# Patient Record
Sex: FEMALE | Race: WHITE | URBAN - METROPOLITAN AREA
[De-identification: names, ages, dates, MRNs, and addresses within clinical notes are randomized per-mention and may not be internally consistent; named-entity substitution may affect disease eponyms.]

---

## 2017-01-02 ENCOUNTER — TELEPHONE (OUTPATIENT)
Dept: FAMILY MEDICINE | Facility: CLINIC | Age: 52
End: 2017-01-02

## 2017-01-02 NOTE — TELEPHONE ENCOUNTER
1/2/2017    Call Regarding Preventive Health Screening Colonoscopy, Mammogram and Cervical/PAP    Attempt 1    Message on voicemail     Comments:           Outreach   kelvin

## 2017-01-09 NOTE — TELEPHONE ENCOUNTER
01/09/17      Call Regarding Preventive Health Screening Colonoscopy, Mammogram and Cervical/PAP    Attempt 1    Message on voicemail     Comments: Fit ordered        Outreach   cnt

## 2017-01-18 ENCOUNTER — TELEPHONE (OUTPATIENT)
Dept: FAMILY MEDICINE | Facility: CLINIC | Age: 52
End: 2017-01-18

## 2017-01-18 DIAGNOSIS — I10 BENIGN ESSENTIAL HYPERTENSION: Primary | ICD-10-CM

## 2017-01-18 NOTE — TELEPHONE ENCOUNTER
Lisinopril      Last Written Prescription Date: 10/17/16  Last Fill Quantity: 30, # refills: 2  Last Office Visit with G, P or [facility] prescribing provider: 10/17/16       POTASSIUM   Date Value Ref Range Status   05/16/2016 3.1* 3.4 - 5.3 mmol/L Final     CREATININE   Date Value Ref Range Status   05/16/2016 0.85 0.52 - 1.04 mg/dL Final     BP Readings from Last 3 Encounters:   10/17/16 142/98   09/12/16 138/99   06/24/16 150/94

## 2017-01-23 RX ORDER — LISINOPRIL 10 MG/1
10 TABLET ORAL DAILY
Qty: 30 TABLET | Refills: 0 | Status: SHIPPED | OUTPATIENT
Start: 2017-01-23 | End: 2017-02-08

## 2017-01-23 RX ORDER — ATENOLOL AND CHLORTHALIDONE TABLET 100; 25 MG/1; MG/1
1 TABLET ORAL DAILY
Qty: 30 TABLET | Refills: 0 | Status: SHIPPED | OUTPATIENT
Start: 2017-01-23 | End: 2017-02-22

## 2017-01-23 NOTE — TELEPHONE ENCOUNTER
Pt calling for refills on both of these. She has been out of her lisinopril for a couple of days, and is wondering if this can be filled by this afternoon please.      atenolol-chlorthalidone (TENORETIC 100) 100-25 MG per tablet        Last Written Prescription Date: 10/17/16  Last Fill Quantity: 90, # refills: 3    Last Office Visit with G, P or [facility] prescribing provider:  10/17/16   Future Office Visit:        BP Readings from Last 3 Encounters:   10/17/16 142/98   09/12/16 138/99   06/24/16 150/94

## 2017-01-23 NOTE — TELEPHONE ENCOUNTER
Patient has been out of medications for a few days.  Patient to restart and then follow up in clinic for RN visit to check blood pressure in about a week.  Patient agrees with this plan.    Jessica Medina RN

## 2017-02-07 ENCOUNTER — TELEPHONE (OUTPATIENT)
Dept: FAMILY MEDICINE | Facility: CLINIC | Age: 52
End: 2017-02-07

## 2017-02-07 NOTE — Clinical Note
[facility]  [address] [address]  Phone: [phone]    February 9, 2017    Toya Mcnair  [address] [place] [address]              Dear Toya,    Your most recent blood pressure reading preformed at our clinic was higher than we like to see it. The goal is to have it under 140/90 in clinic. Please call our clinic [phone] to schedule a blood pressure recheck on our RN schedule. This appointment is free of charge and takes about 15 minutes to complete. Be sure to take all of your blood pressure medications, and avoid stimulants like caffeine, cold medicines, sudafed, or tobacco prior to your recheck.    If your blood pressure medication was changed by your provider recently wait 2 weeks before making this recheck appointment.     Thank you for trusting us with your health care.  Sincerely,    Elizabet Rosales's Care Team

## 2017-02-08 DIAGNOSIS — I10 BENIGN ESSENTIAL HYPERTENSION: Primary | ICD-10-CM

## 2017-02-08 NOTE — TELEPHONE ENCOUNTER
In January, pt was going to restart lisinopril and then return for a blood pressure recheck with the RN.  No pending appt.    Please remind.  Lea Newby RN

## 2017-02-08 NOTE — TELEPHONE ENCOUNTER
Lisinopril      Last Written Prescription Date: 01/23/2017  Last Fill Quantity: 30, # refills: 0  Last Office Visit with [facility], P or [facility] prescribing provider: 10/17/2016       POTASSIUM   Date Value Ref Range Status   05/16/2016 3.1* 3.4 - 5.3 mmol/L Final     CREATININE   Date Value Ref Range Status   05/16/2016 0.85 0.52 - 1.04 mg/dL Final     BP Readings from Last 3 Encounters:   10/17/16 142/98   09/12/16 138/99   06/24/16 150/94     Apolinar LEIVA (R)

## 2017-02-14 NOTE — TELEPHONE ENCOUNTER
Routing refill request to provider for review/approval because:  Labs out of range:  See below    Rayne Scales RN

## 2017-02-15 RX ORDER — LISINOPRIL 10 MG/1
10 TABLET ORAL DAILY
Qty: 15 TABLET | Refills: 0 | Status: SHIPPED | OUTPATIENT
Start: 2017-02-15 | End: 2017-02-22

## 2017-02-15 NOTE — TELEPHONE ENCOUNTER
Partial refill given- patient was suppose to recheck her K+ levels  Please order BMP and have patient get it done since her K+ level was low last time

## 2017-02-22 ENCOUNTER — ALLIED HEALTH/NURSE VISIT (OUTPATIENT)
Dept: FAMILY MEDICINE | Facility: CLINIC | Age: 52
End: 2017-02-22
Payer: COMMERCIAL

## 2017-02-22 VITALS — DIASTOLIC BLOOD PRESSURE: 85 MMHG | SYSTOLIC BLOOD PRESSURE: 122 MMHG | HEART RATE: 72 BPM

## 2017-02-22 DIAGNOSIS — I10 BENIGN ESSENTIAL HYPERTENSION: ICD-10-CM

## 2017-02-22 PROCEDURE — 99207 ZZC NO CHARGE NURSE ONLY: CPT

## 2017-02-22 RX ORDER — ATENOLOL AND CHLORTHALIDONE TABLET 100; 25 MG/1; MG/1
1 TABLET ORAL DAILY
Qty: 30 TABLET | Refills: 3 | Status: SHIPPED | OUTPATIENT
Start: 2017-02-22 | End: 2017-06-08

## 2017-02-22 RX ORDER — LISINOPRIL 10 MG/1
10 TABLET ORAL DAILY
Qty: 30 TABLET | Refills: 3 | Status: SHIPPED | OUTPATIENT
Start: 2017-02-22 | End: 2017-06-08

## 2017-02-22 NOTE — MR AVS SNAPSHOT
"              After Visit Summary   2017    Toya Mcnair    MRN: [id number]           Patient Information     Date Of Birth          1965        Visit Information        Provider Department      2017 3:00 PM LIAT RILEY/ANDRES RN [facility]        Today's Diagnoses     Benign essential hypertension          Care Instructions    Needs potassium lab draw.  Telma Morejon RN         Follow-ups after your visit        Follow-up notes from your care team     Discussed this visit      Who to contact     If you have questions or need follow up information about today's clinic visit or your schedule please contact [facility] directly at [phone].  Normal or non-critical lab and imaging results will be communicated to you by [employer], letter or phone within 4 business days after the clinic has received the results. If you do not hear from us within 7 days, please contact the clinic through [employer] or phone. If you have a critical or abnormal lab result, we will notify you by phone as soon as possible.  Submit refill requests through [employer] or call your pharmacy and they will forward the refill request to us. Please allow 3 business days for your refill to be completed.          Additional Information About Your Visit        MyChart Information     [employer] lets you send messages to your doctor, view your test results, renew your prescriptions, schedule appointments and more. To sign up, go to [email or web address] . Click on \"Log in\" on the left side of the screen, which will take you to the Welcome page. Then click on \"Sign up Now\" on the right side of the page.     You will be asked to enter the access code listed below, as well as some personal information. Please follow the directions to create your username and password.     Your access code is: [email or web address]  Expires: 2017  3:24 PM     Your access code will  in 90 days. If you need help or a new code, please call " your [facility] or [phone].        Care EveryWhere ID     This is your Care EveryWhere ID. This could be used by other organizations to access your [place] medical records  [id number]        Your Vitals Were     Pulse                   72            Blood Pressure from Last 3 Encounters:   02/22/17 122/85   10/17/16 (!) 142/98   09/12/16 (!) 138/99    Weight from Last 3 Encounters:   10/17/16 154 lb 9.6 oz (70.1 kg)   05/02/16 152 lb 12.8 oz (69.3 kg)   05/05/15 152 lb (68.9 kg)              Today, you had the following     No orders found for display         Where to get your medicines      These medications were sent to [id number] - [place], [place] - [address]  [address] [address]    Hours:  Closed 10-16-08 business to [facility] Phone:  [phone]     atenolol-chlorthalidone 100-25 MG per tablet    lisinopril 10 MG tablet          Primary Care Provider Office Phone # Fax #    Elizabet Milton DANA Rosales [facility] [phone]       [facility] [address] [facility] [address]        Thank you!     Thank you for choosing [facility]  for your care. Our goal is always to provide you with excellent care. Hearing back from our patients is one way we can continue to improve our services. Please take a few minutes to complete the written survey that you may receive in the mail after your visit with us. Thank you!             Your Updated Medication List - Protect others around you: Learn how to safely use, store and throw away your medicines at www.disposemymeds.org.          This list is accurate as of: 2/22/17  3:24 PM.  Always use your most recent med list.                   Brand Name Dispense Instructions for use    albuterol 108 (90 BASE) MCG/ACT Inhaler    PROAIR HFA/PROVENTIL HFA/VENTOLIN HFA    1 Inhaler    INHALE 1-2 PUFFS EVERY 4-6 HOURS AS NEEDED       atenolol-chlorthalidone 100-25 MG per tablet    TENORETIC 100    30  tablet    Take 1 tablet by mouth daily       lisinopril 10 MG tablet    PRINIVIL/ZESTRIL    30 tablet    Take 1 tablet (10 mg) by mouth daily       potassium chloride 10 MEQ tablet    K-TAB,KLOR-CON    90 tablet    Take 1 tablet (10 mEq) by mouth daily       WOMENS ONE DAILY Tabs      Take 1 tablet by mouth daily.

## 2017-05-17 NOTE — TELEPHONE ENCOUNTER
5/17/2017    Call Regarding Preventive Health Screening Colonoscopy, Mammogram and Cervical/PAP    Attempt 3    Message on voicemail     Comments:       Outreach   CC

## 2017-06-08 DIAGNOSIS — I10 BENIGN ESSENTIAL HYPERTENSION: ICD-10-CM

## 2017-06-08 NOTE — TELEPHONE ENCOUNTER
Atenolol/Chlor      Last Written Prescription Date: 02/22/17  Last Fill Quantity: 30, # refills: 3    Last Office Visit with [facility] or [facility] prescribing provider:  10/17/16   Future Office Visit:        BP Readings from Last 3 Encounters:   02/22/17 122/85   10/17/16 (!) 142/98   09/12/16 (!) 138/99     Lisinopril      Last Written Prescription Date: 02/22/17  Last Fill Quantity: 30, # refills: 3  Last Office Visit with [facility], [facility] or [facility] prescribing provider: 10/17/16       Potassium   Date Value Ref Range Status   05/16/2016 3.1 (L) 3.4 - 5.3 mmol/L Final     Creatinine   Date Value Ref Range Status   05/16/2016 0.85 0.52 - 1.04 mg/dL Final     BP Readings from Last 3 Encounters:   02/22/17 122/85   10/17/16 (!) 142/98   09/12/16 (!) 138/99

## 2017-06-21 RX ORDER — LISINOPRIL 10 MG/1
10 TABLET ORAL DAILY
Qty: 30 TABLET | Refills: 0 | Status: SHIPPED | OUTPATIENT
Start: 2017-06-21 | End: 2017-06-23

## 2017-06-21 RX ORDER — ATENOLOL AND CHLORTHALIDONE TABLET 100; 25 MG/1; MG/1
1 TABLET ORAL DAILY
Qty: 30 TABLET | Refills: 0 | Status: SHIPPED | OUTPATIENT
Start: 2017-06-21 | End: 2017-06-23

## 2017-06-21 NOTE — TELEPHONE ENCOUNTER
Medication is being filled for 1 time refill only due to:  Patient needs labs bmp ordered. . Kinjal Rubio RNC

## 2017-06-23 ENCOUNTER — OFFICE VISIT (OUTPATIENT)
Dept: FAMILY MEDICINE | Facility: CLINIC | Age: 52
End: 2017-06-23
Payer: COMMERCIAL

## 2017-06-23 VITALS
DIASTOLIC BLOOD PRESSURE: 88 MMHG | WEIGHT: 152 LBS | BODY MASS INDEX: 26.93 KG/M2 | HEIGHT: 63 IN | TEMPERATURE: 99 F | SYSTOLIC BLOOD PRESSURE: 138 MMHG | HEART RATE: 75 BPM

## 2017-06-23 DIAGNOSIS — F17.200 TOBACCO USE DISORDER: ICD-10-CM

## 2017-06-23 DIAGNOSIS — Z12.11 SPECIAL SCREENING FOR MALIGNANT NEOPLASMS, COLON: ICD-10-CM

## 2017-06-23 DIAGNOSIS — I10 BENIGN ESSENTIAL HYPERTENSION: ICD-10-CM

## 2017-06-23 DIAGNOSIS — Z12.39 SCREENING FOR BREAST CANCER: Primary | ICD-10-CM

## 2017-06-23 PROCEDURE — 99214 OFFICE O/P EST MOD 30 MIN: CPT | Performed by: NURSE PRACTITIONER

## 2017-06-23 RX ORDER — ATENOLOL AND CHLORTHALIDONE TABLET 100; 25 MG/1; MG/1
1 TABLET ORAL DAILY
Qty: 90 TABLET | Refills: 3 | Status: SHIPPED | OUTPATIENT
Start: 2017-06-23 | End: 2018-06-28

## 2017-06-23 RX ORDER — LISINOPRIL 10 MG/1
10 TABLET ORAL DAILY
Qty: 90 TABLET | Refills: 3 | Status: SHIPPED | OUTPATIENT
Start: 2017-06-23 | End: 2018-06-28

## 2017-06-23 NOTE — PATIENT INSTRUCTIONS
1. Labs today  2. Send in FIT card  3. Schedule mammogram       Thank you for choosing [facility].  You may be receiving a survey in the mail from Albin Shaikh regarding your visit today.  Please take a few minutes to complete and return the survey to let us know how we are doing.      If you have questions or concerns, please contact us via [employer] or you can contact your care team at [phone].    Our Clinic hours are:  Monday 6:40 am  to 7:00 pm  Tuesday -Friday 6:40 am to 5:00 pm    The [place] outpatient lab hours are:  Monday - Friday 6:10 am to 4:45 pm  Saturdays 7:00 am to 11:00 am  Appointments are required, call [phone]    If you have clinical questions after hours or would like to schedule an appointment,  call the clinic at [phone].

## 2017-06-23 NOTE — NURSING NOTE
"Chief Complaint   Patient presents with     Hypertension     Recheck on blood pressure.     Health Maintenance     Due for a FIT test, mammogram and physical.       Initial BP (!) 139/102  Pulse 75  Temp 99  F (37.2  C) (Tympanic)  Ht 5' 3.25\" (1.607 m)  Wt 152 lb (68.9 kg)  BMI 26.71 kg/m2 Estimated body mass index is 26.71 kg/(m^2) as calculated from the following:    Height as of this encounter: 5' 3.25\" (1.607 m).    Weight as of this encounter: 152 lb (68.9 kg).  Medication Reconciliation: complete  "

## 2017-06-23 NOTE — PROGRESS NOTES
SUBJECTIVE:                                                    Toya Mcnair is a 51 year old female who presents to clinic today for the following health issues:      Hypertension Follow-up      Outpatient blood pressures are being checked at home.  Results are in the normal range she states.  Around 120's/83.    Low Salt Diet: Using salt.  BP Readings from Last 3 Encounters:   06/23/17 138/88   02/22/17 122/85   10/17/16 (!) 142/98     patient smoked prior to coming today      POTASSIUM:  Not able to take Potassium due to it making her gag.  She has been drinking a V8 daily for the 700 mg of Potassium.    MEDICATION QUESTION:  She is dealing with poison ivy on her legs.  Now an area on her arms.  She sis using Calamine lotion on the legs.  She is wanting to know if she can take Benadryl with her current blood pressure mediations for the itch.    Medication Followup of Inhaler    Taking Medication as prescribed: Yes, as needed when she has uri symptoms.    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  Walking 3 days per week during the Summer.    Problems taking medications regularly: No    Medication side effects: none    Diet: Using salt intake.        -------------------------------------    Problem list and histories reviewed & adjusted, as indicated.  Additional history: as documented    Patient Active Problem List   Diagnosis     Anxiety     CARDIOVASCULAR SCREENING; LDL GOAL LESS THAN 160     Tobacco abuse     Benign essential hypertension     Past Surgical History:   Procedure Laterality Date     SURGICAL HISTORY OF -       c section       Social History   Substance Use Topics     Smoking status: Not on file     Smokeless tobacco: Never Used      Comment: cutting back     Alcohol use 0.0 oz/week     0 Standard drinks or equivalent per week      Comment: occ     Family History   Problem Relation Age  "of Onset     HEART DISEASE Mother      C.A.D. Mother      CANCER Maternal Grandmother      Unknown/Adopted Maternal Grandfather      Unknown/Adopted Paternal Grandmother      Unknown/Adopted Paternal Grandfather      CANCER Paternal Grandfather      Breast Cancer Sister      DIABETES Sister      C.A.D. Brother      Heart Transplant Brother 68     Heart Surgery Brother 60     triple bipass           Reviewed and updated as needed this visit by clinical staff  Allergies       Reviewed and updated as needed this visit by Provider         ROS:  Constitutional, HEENT, cardiovascular, pulmonary, GI, , musculoskeletal, neuro, skin, endocrine and psych systems are negative, except as otherwise noted.    OBJECTIVE:     /88  Pulse 75  Temp 99  F (37.2  C) (Tympanic)  Ht 5' 3.25\" (1.607 m)  Wt 152 lb (68.9 kg)  BMI 26.71 kg/m2  Body mass index is 26.71 kg/(m^2).  GENERAL: healthy, alert and no distress  NECK: no adenopathy, no asymmetry, masses, or scars and thyroid normal to palpation  RESP: lungs clear to auscultation - no rales, rhonchi or wheezes  CV: regular rate and rhythm, normal S1 S2, no S3 or S4, no murmur, click or rub, no peripheral edema and peripheral pulses strong  MS: no gross musculoskeletal defects noted, no edema    Diagnostic Test Results:  none     ASSESSMENT/PLAN:       1. Benign essential hypertension  Controlled-  Encouraged to quit smoking  - Basic metabolic panel  - lisinopril (PRINIVIL/ZESTRIL) 10 MG tablet; Take 1 tablet (10 mg) by mouth daily  Dispense: 90 tablet; Refill: 3  - atenolol-chlorthalidone (TENORETIC 100) 100-25 MG per tablet; Take 1 tablet by mouth daily  Dispense: 90 tablet; Refill: 3    2. Screening for breast cancer    - *MA Screening Digital Bilateral; Future    3. Special screening for malignant neoplasms, colon    - Fecal colorectal cancer screen (FIT); Future    4. Tobacco use disorder  Encouraged patient to stop smoking- she declined at this time.           Elizabet" DANA Pandya [facility]

## 2017-06-23 NOTE — MR AVS SNAPSHOT
After Visit Summary   6/23/2017    Toya Mcnair    MRN: [id number]           Patient Information     Date Of Birth          1965        Visit Information        Provider Department      6/23/2017 1:40 PM Elizabet Rosales APRN CNP [facility]        Today's Diagnoses     Screening for breast cancer    -  1    Benign essential hypertension        Special screening for malignant neoplasms, colon        Tobacco use disorder          Care Instructions    1. Labs today  2. Send in FIT card  3. Schedule mammogram       Thank you for choosing [facility].  You may be receiving a survey in the mail from [employer] regarding your visit today.  Please take a few minutes to complete and return the survey to let us know how we are doing.      If you have questions or concerns, please contact us via [employer] or you can contact your care team at [phone].    Our Clinic hours are:  Monday 6:40 am  to 7:00 pm  Tuesday -Friday 6:40 am to 5:00 pm    The [place] outpatient lab hours are:  Monday - Friday 6:10 am to 4:45 pm  Saturdays 7:00 am to 11:00 am  Appointments are required, call [phone]    If you have clinical questions after hours or would like to schedule an appointment,  call the clinic at [phone].            Follow-ups after your visit        Future tests that were ordered for you today     Open Future Orders        Priority Expected Expires Ordered    *MA Screening Digital Bilateral Routine  6/23/2018 6/23/2017    Fecal colorectal cancer screen (FIT) Routine 7/14/2017 9/15/2017 6/23/2017            Who to contact     If you have questions or need follow up information about today's clinic visit or your schedule please contact [facility] directly at [phone].  Normal or non-critical lab and imaging results will be communicated to you by MyChart, letter or phone within 4 business days after the clinic has received the results. If you do not hear from  "us within 7 days, please contact the clinic through [employer] or phone. If you have a critical or abnormal lab result, we will notify you by phone as soon as possible.  Submit refill requests through [employer] or call your pharmacy and they will forward the refill request to us. Please allow 3 business days for your refill to be completed.          Additional Information About Your Visit        [employer] Information     [employer] lets you send messages to your doctor, view your test results, renew your prescriptions, schedule appointments and more. To sign up, go to [email or web address] . Click on \"Log in\" on the left side of the screen, which will take you to the Welcome page. Then click on \"Sign up Now\" on the right side of the page.     You will be asked to enter the access code listed below, as well as some personal information. Please follow the directions to create your username and password.     Your access code is: [email or web address]  Expires: 2017  2:10 PM     Your access code will  in 90 days. If you need help or a new code, please call your [facility] clinic or [phone].        Care EveryWhere ID     This is your Care EveryWhere ID. This could be used by other organizations to access your [facility] medical records  [id number]        Your Vitals Were     Pulse Temperature Height BMI (Body Mass Index)          75 99  F (37.2  C) (Tympanic) 5' 3.25\" (1.607 m) 26.71 kg/m2         Blood Pressure from Last 3 Encounters:   17 138/88   17 122/85   10/17/16 (!) 142/98    Weight from Last 3 Encounters:   17 152 lb (68.9 kg)   10/17/16 154 lb 9.6 oz (70.1 kg)   16 152 lb 12.8 oz (69.3 kg)              We Performed the Following     Basic metabolic panel          Where to get your medicines      These medications were sent to [id number] - [place], [place] - [address]  [address] [address]    Hours:  Closed 10-16-08 business to [facility] Phone:  " [phone]     atenolol-chlorthalidone 100-25 MG per tablet    lisinopril 10 MG tablet          Primary Care Provider Office Phone # Fax #    DANA Kenney [phone]       [facility] [address] [address]        Equal Access to Services     FAN GUILLAUME : Hadii aad ku hadasho Soomaali, waaxda luqadaha, qaybta kaalmada adeegyada, waxay idiin hayaan adeeg kharash lajaqui . So [facility] [phone].    ATENCIÓN: Si habla español, tiene a lopez disposición servicios gratuitos de asistencia lingüística. Arben al [phone].    We comply with applicable federal civil rights laws and [place] laws. We do not discriminate on the basis of race, color, national origin, age, disability sex, sexual orientation or gender identity.            Thank you!     Thank you for choosing [facility]  for your care. Our goal is always to provide you with excellent care. Hearing back from our patients is one way we can continue to improve our services. Please take a few minutes to complete the written survey that you may receive in the mail after your visit with us. Thank you!             Your Updated Medication List - Protect others around you: Learn how to safely use, store and throw away your medicines at www.disposemymeds.org.          This list is accurate as of: 6/23/17  2:10 PM.  Always use your most recent med list.                   Brand Name Dispense Instructions for use Diagnosis    albuterol 108 (90 BASE) MCG/ACT Inhaler    PROAIR HFA/PROVENTIL HFA/VENTOLIN HFA    1 Inhaler    INHALE 1-2 PUFFS EVERY 4-6 HOURS AS NEEDED    Tobacco abuse       atenolol-chlorthalidone 100-25 MG per tablet    TENORETIC 100    90 tablet    Take 1 tablet by mouth daily    Benign essential hypertension       lisinopril 10 MG tablet    PRINIVIL/ZESTRIL    90 tablet    Take 1 tablet (10 mg) by mouth daily    Benign essential hypertension       WOMENS ONE DAILY Tabs      Take 1 tablet by  mouth daily.

## 2017-06-28 ENCOUNTER — TELEPHONE (OUTPATIENT)
Dept: FAMILY MEDICINE | Facility: CLINIC | Age: 52
End: 2017-06-28

## 2017-06-28 NOTE — TELEPHONE ENCOUNTER
Panel Management Review      Patient has the following on her problem list: None      Composite cancer screening  Chart review shows that this patient is due/due soon for the following Pap Smear  Summary:    Patient is due/failing the following:   PAP    Action needed:   Patient needs office visit for pap.    Type of outreach:    Phone, left message for patient to call back.     Questions for provider review:    None                                                                                                                                    Paula Short       Chart routed to Care Team .

## 2017-07-15 ENCOUNTER — HEALTH MAINTENANCE LETTER (OUTPATIENT)
Age: 52
End: 2017-07-15

## 2017-07-19 DIAGNOSIS — I10 BENIGN ESSENTIAL HYPERTENSION: ICD-10-CM

## 2017-07-19 LAB
ANION GAP SERPL CALCULATED.3IONS-SCNC: 7 MMOL/L (ref 3–14)
BUN SERPL-MCNC: 12 MG/DL (ref 7–30)
CALCIUM SERPL-MCNC: 9.1 MG/DL (ref 8.5–10.1)
CHLORIDE SERPL-SCNC: 103 MMOL/L (ref 94–109)
CO2 SERPL-SCNC: 28 MMOL/L (ref 20–32)
CREAT SERPL-MCNC: 1.05 MG/DL (ref 0.52–1.04)
GFR SERPL CREATININE-BSD FRML MDRD: 55 ML/MIN/1.7M2
GLUCOSE SERPL-MCNC: 79 MG/DL (ref 70–99)
POTASSIUM SERPL-SCNC: 3.8 MMOL/L (ref 3.4–5.3)
SODIUM SERPL-SCNC: 138 MMOL/L (ref 133–144)

## 2017-07-19 PROCEDURE — 80048 BASIC METABOLIC PNL TOTAL CA: CPT | Performed by: NURSE PRACTITIONER

## 2017-07-19 PROCEDURE — 36415 COLL VENOUS BLD VENIPUNCTURE: CPT | Performed by: NURSE PRACTITIONER

## 2017-07-19 NOTE — LETTER
Toya NIELSEN Xu  [address] [place] [id number]        July 20, 2017          Dear ,    We are writing to inform you of your test results.    Your lab results were within normal limits.      Thank you very much for choosing [facility]. Please call my office if you have any questions or concerns.       Sincerely,        Elizabet Rosales NP

## 2017-09-13 ENCOUNTER — TELEPHONE (OUTPATIENT)
Dept: FAMILY MEDICINE | Facility: CLINIC | Age: 52
End: 2017-09-13

## 2017-09-13 NOTE — TELEPHONE ENCOUNTER
Pt is due for a PAP, Mammo and a FIT Test. Left a message for patient to return call.  Ebony Bravo, CMA

## 2017-09-13 NOTE — LETTER
November 15, 2017      Toya Parrishjoe  [address] [place] [id number]    Dear ,      This letter is to remind you that you are due for your pap and mammogram    Please call [phone] to schedule your appointment at your earliest convenience.     If you have completed the tests outside of [place], please have the results forwarded to our office. We will update the chart for your primary Physician to review before your next annual physical.     Sincerely,      DANA Kenney CNP

## 2017-09-13 NOTE — LETTER
"September 20, 2017      Toya Mcnair  [address] [facility] [address]        Dear Toya,     Dear Toya Mcnair,    Your clinic record indicates that you are due for Mammogram and Pap and physical exam. Please call the  at [phone] to schedule an appointment for a pappe/physical. Mammograms can be scheduled by calling diagnostic imaging at [phone].     At this time it appears as if at your 6/23/17 visit we had given you a \"FIT\" stool test kit to bring home and mail in when done. Per your chart this has not been fulfilled. If you could please complete the test and mail it in the addressed envelope.    We are trying to help our patients achieve their health care goals.  If you have had colon cancer screening done elsewhere, please have a copy of your results sent to our clinic so we can update your file.      If you have any questions, please feel free to contact the clinic.      Sincerely,        DANA Kenney CNP          "

## 2017-09-20 NOTE — TELEPHONE ENCOUNTER
Panel Management Review      Patient has the following on her problem list:     Hypertension   Last three blood pressure readings:  BP Readings from Last 3 Encounters:   06/23/17 138/88   02/22/17 122/85   10/17/16 (!) 142/98     Blood pressure: Passed    HTN Guidelines:  Age 18-59 BP range:  Less than 140/90  Age 60-85 with Diabetes:  Less than 140/90  Age 60-85 without Diabetes:  less than 150/90        Composite cancer screening  Chart review shows that this patient is due/due soon for the following Pap Smear, Mammogram and Fecal Colorectal (FIT)  Summary:    Patient is due/failing the following:   FOLLOW UP, MAMMOGRAM and PAP    Action needed:   Patient needs office visit for pappe/pe, due for mammogram,fit test . Letter sent    Type of outreach:    Sent letter.    Questions for provider review:      None                                                                                                                                 Paula Short

## 2017-12-14 ENCOUNTER — HOSPITAL ENCOUNTER (EMERGENCY)
Facility: CLINIC | Age: 52
Discharge: HOME | End: 2017-12-14
Attending: FAMILY MEDICINE | Admitting: FAMILY MEDICINE
Payer: COMMERCIAL

## 2017-12-14 VITALS
HEIGHT: 63 IN | DIASTOLIC BLOOD PRESSURE: 77 MMHG | TEMPERATURE: 98.1 F | WEIGHT: 150 LBS | SYSTOLIC BLOOD PRESSURE: 108 MMHG | BODY MASS INDEX: 26.58 KG/M2 | OXYGEN SATURATION: 99 % | RESPIRATION RATE: 16 BRPM

## 2017-12-14 DIAGNOSIS — K59.00 CONSTIPATION, UNSPECIFIED CONSTIPATION TYPE: ICD-10-CM

## 2017-12-14 DIAGNOSIS — K62.5 RECTAL BLEEDING: ICD-10-CM

## 2017-12-14 DIAGNOSIS — K60.2 ANAL FISSURE: ICD-10-CM

## 2017-12-14 LAB — HGB BLD-MCNC: 13.2 G/DL (ref 11.7–15.7)

## 2017-12-14 PROCEDURE — 85018 HEMOGLOBIN: CPT | Performed by: FAMILY MEDICINE

## 2017-12-14 PROCEDURE — 99284 EMERGENCY DEPT VISIT MOD MDM: CPT | Mod: Z6 | Performed by: FAMILY MEDICINE

## 2017-12-14 PROCEDURE — 99283 EMERGENCY DEPT VISIT LOW MDM: CPT | Performed by: FAMILY MEDICINE

## 2017-12-14 RX ORDER — LIDOCAINE 50 MG/G
OINTMENT TOPICAL PRN
Qty: 30 G | Refills: 0 | Status: SHIPPED | OUTPATIENT
Start: 2017-12-14

## 2017-12-14 ASSESSMENT — ENCOUNTER SYMPTOMS
HEADACHES: 0
DIAPHORESIS: 0
CHILLS: 0
BLOOD IN STOOL: 1
SHORTNESS OF BREATH: 0
VOMITING: 0
DYSURIA: 0
PALPITATIONS: 0
DIARRHEA: 0
ABDOMINAL PAIN: 0
CONSTIPATION: 0
NAUSEA: 0
FEVER: 0
SORE THROAT: 0
FREQUENCY: 0
COUGH: 0
SINUS PRESSURE: 0
WHEEZING: 0

## 2017-12-14 NOTE — DISCHARGE INSTRUCTIONS
ICD-10-CM    1. Anal fissure K60.2     glyceryl trinitrate 0.2% applied to perianal area for 6 weeks.  May use lidocaine jelly on this area for the next 1-2 days as normalizing bowel movements   2. Rectal bleeding K62.5     You need a colonoscopy.  Please contact Dr. Rosales to schedule   3. Constipation, unspecified constipation type K59.00     Use topical lidocaine on the perianal area as needed today before doing this. Use fleets enema today x1.  hold for as long as possible.  Then take magnesium citrate 1 bottle.  Then miralax 1 capful daily for 1 week. Maintain hydration >64 oz/day fluid. Then fiber citrucel or metamucil everyday.           Lower GI Bleeding (Stable)  You have signs of blood in your stool. This is called rectal bleeding. The bleeding may have begun in another part of your gastrointestinal (GI) tract. If the blood is bright red, it is likely coming from the lower part of the GI tract. If the blood is black or dark, it might be coming from higher up in the GI tract. Very small amounts of GI bleeding may not be visible and can only be discovered during a test on your stool. Possible causes of lower GI bleeding include:    Hemorrhoids    Anal fissures    Diverticulitis    Inflammatory bowel disease (Crohn's disease or ulcerative colitis)    Polyps (growths) in the intestine  Note: Iron supplements and medicines for diarrhea or upset stomach can cause black stools. Foods such as licorice and red beets can also discolor the stool and be mistaken for bleeding. These are not bleeding and are not a cause for alarm.  Home care  You have not lost a large amount of blood and your condition appears stable at this time. You may resume normal activity as long as you feel well.  Avoid NSAIDs, such as aspirin, ibuprofen, or naproxen. They can irritate the stomach and cause further bleeding. If you are taking these medicines for other medical reasons, talk to your healthcare provider before you stop  them.   Follow-up care  Follow up with your healthcare provider as advised. Further tests may be needed to find the cause of your bleeding.  When to seek medical advice  Call your healthcare provider for any of the following:    Large amount of rectal bleeding     Increasing abdominal pain    Weakness, dizziness  Call 911  Get emergency medical care if any of the following occur:    Loss of consciousness    Vomiting blood  Date Last Reviewed: 6/24/2015 2000-2017 [employer]. [address] [place] [address], [place] [address]. All rights reserved. This information is not intended as a substitute for professional medical care. Always follow your healthcare professional's instructions.      Treating Constipation    Constipation is a common and often uncomfortable problem. Constipation means you have bowel movements fewer than 3 times per week, or strain to pass hard, dry stool. It can last a short time. Or it can be a problem that never seems to go away. The good news is that it can often be treated and controlled.  Eat more fiber  One of the best ways to help treat constipation is to increase your fiber intake. You can do this either through diet or by using fiber supplements. Fiber (in whole grains, fruits, and vegetables) adds bulk and absorbs water to soften the stool. This helps the stool pass through the colon more easily. When you increase your fiber intake, do it slowly to avoid side effects such as bloating. Also increase the amount of water that you drink. Eating more of the following foods can add fiber to your diet.    High-fiber cereals    Whole grains, bran, and brown rice    Vegetables such as carrots, broccoli, and greens    Fresh fruits (especially apples, pears, and dried fruits like raisins and apricots)    Nuts and legumes (especially beans such as lentils, kidney beans, and lima beans)  Get physically active  Exercise helps improve the working of your colon which helps ease constipation.  Try to get some physical activity every day. If you haven t been active for a while, talk to your healthcare provider before starting again.  Laxatives  Your healthcare provider may suggest an over-the-counter product to help ease your constipation. He or she may suggest the use of bulk-forming agents or laxatives. The use of laxatives, if used as directed, is common and safe. Follow directions carefully when using them. See your healthcare provider for new-onset constipation, or long-term constipation, to rule out other causes such as medicines or thyroid disease.  Date Last Reviewed: 7/1/2016 2000-2017 [employer]. [address] [place] [address], [place] [address]. All rights reserved. This information is not intended as a substitute for professional medical care. Always follow your healthcare professional's instructions.

## 2017-12-14 NOTE — ED AVS SNAPSHOT
[facility] Emergency Department    [address] [address]    Phone:  [phone]    Fax:  [phone]                                       Toya Mcnair   MRN: [id number]    Department:  [facility] Emergency Department   Date of Visit:  12/14/2017           After Visit Summary Signature Page     I have received my discharge instructions, and my questions have been answered. I have discussed any challenges I see with this plan with the nurse or doctor.    ..........................................................................................................................................  Patient/Patient Representative Signature      ..........................................................................................................................................  Patient Representative Print Name and Relationship to Patient    ..................................................               ................................................  Date                                            Time    ..........................................................................................................................................  Reviewed by Signature/Title    ...................................................              ..............................................  Date                                                            Time

## 2017-12-14 NOTE — ED AVS SNAPSHOT
[facility] Emergency Department    [address] [address]    Phone:  [phone]    Fax:  [phone]                                       Toya Mcnair   MRN: [id number]    Department:  [facility] Emergency Department   Date of Visit:  12/14/2017           Patient Information     Date Of Birth          1965        Your diagnoses for this visit were:     Anal fissure glyceryl trinitrate 0.2% applied to perianal area for 6 weeks.  May use lidocaine jelly on this area for the next 1-2 days as normalizing bowel movements    Rectal bleeding You need a colonoscopy.  Please contact Dr. Rosales to schedule    Constipation, unspecified constipation type Use topical lidocaine on the perianal area as needed today before doing this. Use fleets enema today x1.  hold for as long as possible.  Then take magnesium citrate 1 bottle.  Then miralax 1 capful daily for 1 week. Maintain hydration >64 oz/day fluid. Then fiber citrucel or metamucil everyday.       You were seen by Hong Valente MD.      Follow-up Information     Follow up with Elizabet Rosales APRN CNP In 1 week.    Specialty:  Nurse Practitioner    Contact information:    [address] [place] [address] [phone]          Follow up with [facility] Emergency Department.    Specialty:  EMERGENCY MEDICINE    Why:  As needed, If symptoms worsen    Contact information:    [address] [place] [address] [phone]    Additional information:    The medical center is located at   [address] [place] [address]. (between [address]   [address] in [place], four miles north   of [place]).        Discharge Instructions         ICD-10-CM    1. Anal fissure K60.2     glyceryl trinitrate 0.2% applied to perianal area for 6 weeks.  May use lidocaine jelly on this area for the next 1-2 days as normalizing bowel movements   2. Rectal bleeding K62.5     You need a colonoscopy.  Please contact Dr. Rosales to schedule   3.  Constipation, unspecified constipation type K59.00     Use topical lidocaine on the perianal area as needed today before doing this. Use fleets enema today x1.  hold for as long as possible.  Then take magnesium citrate 1 bottle.  Then miralax 1 capful daily for 1 week. Maintain hydration >64 oz/day fluid. Then fiber citrucel or metamucil everyday.           Lower GI Bleeding (Stable)  You have signs of blood in your stool. This is called rectal bleeding. The bleeding may have begun in another part of your gastrointestinal (GI) tract. If the blood is bright red, it is likely coming from the lower part of the GI tract. If the blood is black or dark, it might be coming from higher up in the GI tract. Very small amounts of GI bleeding may not be visible and can only be discovered during a test on your stool. Possible causes of lower GI bleeding include:    Hemorrhoids    Anal fissures    Diverticulitis    Inflammatory bowel disease (Crohn's disease or ulcerative colitis)    Polyps (growths) in the intestine  Note: Iron supplements and medicines for diarrhea or upset stomach can cause black stools. Foods such as licorice and red beets can also discolor the stool and be mistaken for bleeding. These are not bleeding and are not a cause for alarm.  Home care  You have not lost a large amount of blood and your condition appears stable at this time. You may resume normal activity as long as you feel well.  Avoid NSAIDs, such as aspirin, ibuprofen, or naproxen. They can irritate the stomach and cause further bleeding. If you are taking these medicines for other medical reasons, talk to your healthcare provider before you stop them.   Follow-up care  Follow up with your healthcare provider as advised. Further tests may be needed to find the cause of your bleeding.  When to seek medical advice  Call your healthcare provider for any of the following:    Large amount of rectal bleeding     Increasing abdominal pain    Weakness,  dizziness  Call 911  Get emergency medical care if any of the following occur:    Loss of consciousness    Vomiting blood  Date Last Reviewed: 6/24/2015 2000-2017 [employer]. [address] [place] [address], [place] [address]. All rights reserved. This information is not intended as a substitute for professional medical care. Always follow your healthcare professional's instructions.      Treating Constipation    Constipation is a common and often uncomfortable problem. Constipation means you have bowel movements fewer than 3 times per week, or strain to pass hard, dry stool. It can last a short time. Or it can be a problem that never seems to go away. The good news is that it can often be treated and controlled.  Eat more fiber  One of the best ways to help treat constipation is to increase your fiber intake. You can do this either through diet or by using fiber supplements. Fiber (in whole grains, fruits, and vegetables) adds bulk and absorbs water to soften the stool. This helps the stool pass through the colon more easily. When you increase your fiber intake, do it slowly to avoid side effects such as bloating. Also increase the amount of water that you drink. Eating more of the following foods can add fiber to your diet.    High-fiber cereals    Whole grains, bran, and brown rice    Vegetables such as carrots, broccoli, and greens    Fresh fruits (especially apples, pears, and dried fruits like raisins and apricots)    Nuts and legumes (especially beans such as lentils, kidney beans, and lima beans)  Get physically active  Exercise helps improve the working of your colon which helps ease constipation. Try to get some physical activity every day. If you haven t been active for a while, talk to your healthcare provider before starting again.  Laxatives  Your healthcare provider may suggest an over-the-counter product to help ease your constipation. He or she may suggest the use of bulk-forming agents or  laxatives. The use of laxatives, if used as directed, is common and safe. Follow directions carefully when using them. See your healthcare provider for new-onset constipation, or long-term constipation, to rule out other causes such as medicines or thyroid disease.  Date Last Reviewed: 7/1/2016 2000-2017 [employer]. [address] [place] [address], [place] [address]. All rights reserved. This information is not intended as a substitute for professional medical care. Always follow your healthcare professional's instructions.              Discharge References/Attachments     SITZ BATH, TAKING A (ENGLISH)      24 Hour Appointment Hotline       To make an appointment at any [facility], call [phone] ([phone]). If you don't have a family doctor or clinic, we will help you find one. [facility] are conveniently located to serve the needs of you and your family.             Review of your medicines      START taking        Dose / Directions Last dose taken    GLYCERYL TRINITRATE OINTMENT 0.2%   Dose:  1 applicator   Quantity:  30 g        Place 1 applicator rectally 2 times daily   Refills:  0        lidocaine 5 % ointment   Commonly known as:  XYLOCAINE   Quantity:  30 g        Apply topically as needed for moderate pain (at anal area)   Refills:  0          Our records show that you are taking the medicines listed below. If these are incorrect, please call your family doctor or clinic.        Dose / Directions Last dose taken    albuterol 108 (90 BASE) MCG/ACT Inhaler   Commonly known as:  PROAIR HFA/PROVENTIL HFA/VENTOLIN HFA   Quantity:  1 Inhaler        INHALE 1-2 PUFFS EVERY 4-6 HOURS AS NEEDED   Refills:  2        atenolol-chlorthalidone 100-25 MG per tablet   Commonly known as:  TENORETIC 100   Dose:  1 tablet   Quantity:  90 tablet        Take 1 tablet by mouth daily   Refills:  3        lisinopril 10 MG tablet   Commonly known as:  PRINIVIL/ZESTRIL   Dose:  10 mg   Quantity:  90  tablet        Take 1 tablet (10 mg) by mouth daily   Refills:  3        WOMENS ONE DAILY Tabs   Dose:  1 tablet        Take 1 tablet by mouth daily.   Refills:  0                Prescriptions were sent or printed at these locations (2 Prescriptions)                   [facility] [place], [place] - [address]   [address] [address]    Telephone:  [phone]   Fax:  [phone]   Hours:                  E-Prescribed (2 of 2)         Nitroglycerin (GLYCERYL TRINITRATE OINTMENT 0.2%)               lidocaine (XYLOCAINE) 5 % ointment                Procedures and tests performed during your visit     Hemaglobin      Orders Needing Specimen Collection     None      Pending Results     No orders found from 12/12/2017 to 12/15/2017.            Pending Culture Results     No orders found from 12/12/2017 to 12/15/2017.            Pending Results Instructions     If you had any lab results that were not finalized at the time of your Discharge, you can call the ED Lab Result RN at [phone]. You will be contacted by this team for any positive Lab results or changes in treatment. The nurses are available 7 days a week from 10A to 6:30P.  You can leave a message 24 hours per day and they will return your call.        Test Results From Your Hospital Stay        12/14/2017 10:34 AM      Component Results     Component Value Ref Range & Units Status    Hemoglobin 13.2 11.7 - 15.7 g/dL Final                Thank you for choosing [facility]       Thank you for choosing [facility] for your care. Our goal is always to provide you with excellent care. Hearing back from our patients is one way we can continue to improve our services. Please take a few minutes to complete the written survey that you may receive in the mail after you visit with us. Thank you!        [employer] Information     [employer] lets you send messages to your doctor, view your test results, renew your prescriptions, schedule  "appointments and more. To sign up, go to [email or web address] . Click on \"Log in\" on the left side of the screen, which will take you to the Welcome page. Then click on \"Sign up Now\" on the right side of the page.     You will be asked to enter the access code listed below, as well as some personal information. Please follow the directions to create your username and password.     Your access code is: [email or web address]  Expires: 3/14/2018 10:00 AM     Your access code will  in 90 days. If you need help or a new code, please call your [facility] clinic or [phone].        Care EveryWhere ID     This is your Care EveryWhere ID. This could be used by other organizations to access your [facility] medical records  [id number]        Equal Access to Services     FAN GUILLAUME : Hadtrinity Frost, virgilio villeda, alicia ardon, rosaura pham . So [facility] [phone].    ATENCIÓN: Si habla español, tiene a lopez disposición servicios gratuitos de asistencia lingüística. Llame al [phone].    We comply with applicable federal civil rights laws and [place] laws. We do not discriminate on the basis of race, color, national origin, age, disability, sex, sexual orientation, or gender identity.            After Visit Summary       This is your record. Keep this with you and show to your community pharmacist(s) and doctor(s) at your next visit.                  "

## 2017-12-14 NOTE — ED PROVIDER NOTES
History     Chief Complaint   Patient presents with     Rectal Bleeding     bright red bleeding with BM for past few days     HPI  Toya Mcnair is a 52 year old female who presented with rectal bleeding onset 2 weeks ago with cut glass sensation in the perianal area and constipation.  She has difficulty straining with the stool in the past month.  She has no vaginal discharge or bleeding.  She is not sexually active.  No.  In the last 6 months.  She has no dysuria urgency frequency or hematuria.  She has no bleeding disorders.  She has no nausea or vomiting.  She has not had prior gastrointestinal disorders.  1 prior .  No abdominal pain.    Blood she is seen has been primarily on the toilet paper and in the small amounts of the toilet.  She feels a swelling in this area.        Problem List:    Patient Active Problem List    Diagnosis Date Noted     Benign essential hypertension 2014     Priority: Medium     Tobacco abuse 2011     Priority: Medium     CARDIOVASCULAR SCREENING; LDL GOAL LESS THAN 160 10/31/2010     Priority: Medium     Anxiety 2010     Priority: Medium        Past Medical History:    Past Medical History:   Diagnosis Date     NO ACTIVE PROBLEMS        Past Surgical History:    Past Surgical History:   Procedure Laterality Date     SURGICAL HISTORY OF -       c section       Family History:    Family History   Problem Relation Age of Onset     HEART DISEASE Mother      C.A.D. Mother      CANCER Maternal Grandmother      Unknown/Adopted Maternal Grandfather      Unknown/Adopted Paternal Grandmother      Unknown/Adopted Paternal Grandfather      CANCER Paternal Grandfather      Breast Cancer Sister      DIABETES Sister      C.A.D. Brother      Heart Transplant Brother 68     Heart Surgery Brother 60     triple bipass       Social History:  Marital Status:   [2]  Social History   Substance Use Topics     Smoking status: Not on file     Smokeless tobacco: Never  "Used      Comment: cutting back     Alcohol use 0.0 oz/week     0 Standard drinks or equivalent per week      Comment: occ        Medications:      lisinopril (PRINIVIL/ZESTRIL) 10 MG tablet   atenolol-chlorthalidone (TENORETIC 100) 100-25 MG per tablet   albuterol (PROAIR HFA, PROVENTIL HFA, VENTOLIN HFA) 108 (90 BASE) MCG/ACT inhaler   Multiple Vitamins-Minerals (WOMENS ONE DAILY) TABS         Review of Systems   Constitutional: Negative for chills, diaphoresis and fever.   HENT: Negative for ear pain, sinus pressure and sore throat.    Eyes: Negative for visual disturbance.   Respiratory: Negative for cough, shortness of breath and wheezing.    Cardiovascular: Negative for chest pain and palpitations.   Gastrointestinal: Positive for blood in stool. Negative for abdominal pain, constipation, diarrhea, nausea and vomiting.   Genitourinary: Negative for dysuria, frequency and urgency.   Skin: Negative for rash.   Neurological: Negative for headaches.   All other systems reviewed and are negative.      Physical Exam   BP: 131/88  Heart Rate: 72  Temp: 98.1  F (36.7  C)  Resp: 16  Height: 160 cm (5' 3\")  Weight: 68 kg (150 lb)  SpO2: 100 %      Physical Exam   Constitutional: No distress.   Cardiovascular: Normal rate and regular rhythm.  Exam reveals no gallop and no friction rub.    No murmur heard.  Pulmonary/Chest: No respiratory distress. She has no wheezes. She has no rales.   Abdominal: Bowel sounds are normal. She exhibits no distension. There is no tenderness. There is no rebound and no guarding.   Skin: No rash noted. She is not diaphoretic.     Perianal area is quite tender without signs of thrombosed hemorrhoid.  There is a our couple of non-engorged hemorrhoids present.  I do not see the anal fissure but I suspect it is a 12 o'clock position.  This is making the exam quite uncomfortable.  She will not tolerate a endoscopy.  No obvious impaction felt on rectal exam.  No masses palpated.      ED Course "     ED Course     Procedures               Critical Care time:  none               Labs Ordered and Resulted from Time of ED Arrival Up to the Time of Departure from the ED - No data to display    Assessments & Plan (with Medical Decision Making)     MDM: Toya Mcnair is a 52 year old female with blood per rectum appears to be focal at the perianal area.   Cut glass sensation.  Findings on exam most consistent with anal fissure although the actual fissure is not seen but she is very tender to palpation at the 12 o'clock position without mass here.  Seen blood in the toilet paper and this is likely the source.  Made recommendations as below regarding acute management and also further workup including ultimate colonoscopy.  We will also treat her comorbid constipation.      I have reviewed the nursing notes.    I have reviewed the findings, diagnosis, plan and need for follow up with the patient.       New Prescriptions    No medications on file       Final diagnoses:   Anal fissure - glyceryl trinitrate 0.2% applied to perianal area for 6 weeks.  May use lidocaine jelly on this area for the next 1-2 days as normalizing bowel movements   Rectal bleeding - You need a colonoscopy.  Please contact Dr. Rosales to schedule   Constipation, unspecified constipation type - Use topical lidocaine on the perianal area as needed today before doing this. Use fleets enema today x1.  hold for as long as possible.  Then take magnesium citrate 1 bottle.  Then miralax 1 capful daily for 1 week. Maintain hydration >64 oz/day fluid. Then fiber citrucel or metamucil everyday.       12/14/2017   [facility] EMERGENCY DEPARTMENT     Hong Valente MD  12/14/17 [id number]

## 2018-04-04 ENCOUNTER — TELEPHONE (OUTPATIENT)
Dept: FAMILY MEDICINE | Facility: CLINIC | Age: 53
End: 2018-04-04

## 2018-04-04 NOTE — LETTER
[facility]  [address]  [facility] [address]  [phone]        April 4, 2018  Toya Mcnair  [address] [facility] [address]    Dear Toya,    I care about your health and have reviewed your health plan. I have reviewed your medical conditions, medication list, and lab results and am making recommendations based on this review, to better manage your health.    You are in particular need of attention regarding:  -Breast Cancer Screening  -Colon Cancer Screening  -Cervical Cancer Screening    I am recommending that you:  -schedule a MAMMOGRAM which is due. We have a mobile mammogram unit that comes to the [facility] every Tuesday from 8:00am to 4:45pm. To schedule just call the clinic at [phone]. Or, you can call [facility] at [phone] to schedule this.  Please disregard this reminder if you have had this exam elsewhere within the last year.  It would be helpful for us to have a copy of your mammogram report in our file so that we can best coordinate your care.  -schedule a COLONOSCOPY to look for colon cancer (due every 10 years or 5 years in higher risk situations.)   Colon cancer is now the second leading cause of death in the [place] for both men and women and there are over 130,000 new cases and 50,000 deaths per year from colon cancer.  Colonoscopies can prevent 90-95% of these deaths.  Problem lesions can be removed before they ever become cancer.  This test is not only looking for cancer, but also getting rid of precancerious lesions.  If you do not wish to do a colonoscopy or cannot afford to do one, at this time, there is another option. It is called a FIT test or Fecal Immunochemical Occult Blood Test (take home stool sample kit).  It does not replace the colonoscopy for colorectal cancer screening, but it can detect hidden bleeding in the lower colon.  It does need to be repeated every year and if a  positive result is obtained, you would be referred for a colonoscopy.  If you have completed either one of these tests at another facility, please have the records sent to our clinic so that we can best coordinate your care.  -schedule a PAP SMEAR EXAM which is due.  Please disregard this reminder if you have had this exam elsewhere within the last year.  It would be helpful for us to have a copy of your recent pap smear report in our file so that we can best coordinate your care.    Here is a list of Health Maintenance topics that are due now or due soon:  Health Maintenance Due   Topic Date Due     FIT Q1 YR  07/02/1976     PAP SCREENING Q3 YR (SYSTEM ASSIGNED)  07/02/1987     MAMMO SCREEN Q2 YR (SYSTEM ASSIGNED)  07/02/2016       Please call us at [phone] (or use [employer]oxo) to address the above recommendations.     Thank you for trusting [facility] and we appreciate the opportunity to serve you.  We look forward to supporting your healthcare needs in the future.    Healthy Regards,      Elizabet Rosales, SHAYE

## 2018-04-04 NOTE — TELEPHONE ENCOUNTER
Pt is due for a mammogram, a pap and a FIT Test. A letter was sent as a reminder because the phone number is disconnected.   Ebony Bravo CMA        Panel Management Review      Patient has the following on her problem list:     Hypertension   Last three blood pressure readings:  BP Readings from Last 3 Encounters:   12/14/17 108/77   06/23/17 138/88   02/22/17 122/85     Blood pressure: Passed    HTN Guidelines:  Age 18-59 BP range:  Less than 140/90  Age 60-85 with Diabetes:  Less than 140/90  Age 60-85 without Diabetes:  less than 150/90      Composite cancer screening  Chart review shows that this patient is due/due soon for the following Pap Smear, Mammogram and Fecal Colorectal (FIT)  Summary:    Patient is due/failing the following:   FIT, MAMMOGRAM and PAP    Action needed:   Patient needs office visit for a physical, a mammogram and to also complete a FIT Test..    Type of outreach:    Sent letter.  Number was disconnected.    Questions for provider review:    None                                                                                                                                    Ebony Bravo CMA       Chart routed to NONE .

## 2018-05-10 ENCOUNTER — OFFICE VISIT (OUTPATIENT)
Dept: FAMILY MEDICINE | Facility: CLINIC | Age: 53
End: 2018-05-10
Payer: COMMERCIAL

## 2018-05-10 VITALS
TEMPERATURE: 98.2 F | BODY MASS INDEX: 26.9 KG/M2 | HEART RATE: 83 BPM | SYSTOLIC BLOOD PRESSURE: 136 MMHG | HEIGHT: 63 IN | WEIGHT: 151.8 LBS | DIASTOLIC BLOOD PRESSURE: 82 MMHG

## 2018-05-10 DIAGNOSIS — K64.4 EXTERNAL HEMORRHOIDS: ICD-10-CM

## 2018-05-10 DIAGNOSIS — R39.9 SYMPTOMS INVOLVING URINARY SYSTEM: Primary | ICD-10-CM

## 2018-05-10 LAB
ALBUMIN UR-MCNC: NEGATIVE MG/DL
APPEARANCE UR: ABNORMAL
BILIRUB UR QL STRIP: NEGATIVE
COLOR UR AUTO: YELLOW
GLUCOSE UR STRIP-MCNC: NEGATIVE MG/DL
HGB UR QL STRIP: ABNORMAL
KETONES UR STRIP-MCNC: NEGATIVE MG/DL
LEUKOCYTE ESTERASE UR QL STRIP: NEGATIVE
NITRATE UR QL: NEGATIVE
NON-SQ EPI CELLS #/AREA URNS LPF: ABNORMAL /LPF
PH UR STRIP: 6 PH (ref 5–7)
RBC #/AREA URNS AUTO: ABNORMAL /HPF
SOURCE: ABNORMAL
SP GR UR STRIP: 1.02 (ref 1–1.03)
URATE CRY #/AREA URNS HPF: ABNORMAL /HPF
UROBILINOGEN UR STRIP-ACNC: 1 EU/DL (ref 0.2–1)
WBC #/AREA URNS AUTO: ABNORMAL /HPF

## 2018-05-10 PROCEDURE — 99214 OFFICE O/P EST MOD 30 MIN: CPT | Performed by: NURSE PRACTITIONER

## 2018-05-10 PROCEDURE — 81001 URINALYSIS AUTO W/SCOPE: CPT | Performed by: NURSE PRACTITIONER

## 2018-05-10 NOTE — PATIENT INSTRUCTIONS
1. Schedule an appointment with surgery       Thank you for choosing [facility].  You may be receiving a survey in the mail from [employer]ikh regarding your visit today.  Please take a few minutes to complete and return the survey to let us know how we are doing.      If you have questions or concerns, please contact us via [employer] or you can contact your care team at [phone].    Our Clinic hours are:  Monday 6:40 am  to 7:00 pm  Tuesday -Friday 6:40 am to 5:00 pm    The [place] outpatient lab hours are:  Monday - Friday 6:10 am to 4:45 pm  Saturdays 7:00 am to 11:00 am  Appointments are required, call [phone]    If you have clinical questions after hours or would like to schedule an appointment,  call the clinic at [phone].    Treating Hemorrhoids: Self-Care    Follow your healthcare provider s advice about caring for your hemorrhoids at home. Some treatments help relieve symptoms right away. Others involve making changes in your diet and exercise habits. These can help ease constipation and prevent hemorrhoid symptoms from coming back.  Relieving symptoms  Your healthcare provider may prescribe anti-inflammatory medicine to help ease your symptoms. The following tips will also help relieve pain and swelling.    Take sitz baths. Taking a sitz bath means sitting in a few inches of warm bath water. Soaking for 10 minutes twice a day can provide welcome relief from painful hemorrhoids. It can also help the area stay clean.    Develop good bowel habits. Use the bathroom when you need to. Don t ignore the urge to move your bowels. This can lead to constipation, hard stools, and straining. Also, don t read while on the toilet. Sit only as long as needed. Wipe gently with soft, unscented toilet tissue or baby wipes.    Use ice packs. Placing an ice pack on a thrombosed external hemorrhoid can help relieve pain right away. It will also help reduce the blood clot. Use the ice for 15 to 20 minutes at a  time. Keep a cloth between the ice and your skin to prevent skin damage.    Use other measures. Laxatives and enemas can help ease constipation. But use them only on your healthcare provider s advice. For symptom relief, try using cotton pads soaked in witch hazel. These are available at most drugstores. Over-the-counter hemorrhoid ointments and petroleum jelly can also provide relief.  Add fiber to your diet  Adding fiber to your diet can help relieve constipation by making stools softer and easier to pass. To increase your fiber intake, your healthcare provider may recommend a bulking agent, such as psyllium. This is a high-fiber supplement available at most grocery stores and drugstores. Eating more fiber-rich foods will also help. There are two types of fiber:    Insoluble fiber is the main ingredient in bulking agents. It s also found in foods such as wheat bran, whole-grain breads, fresh fruits, and vegetables.    Soluble fiber is found in foods such as oat bran. Although soluble fiber is good for you, it may not ease constipation as much as foods high in insoluble fiber.  Drink more water  Along with a high-fiber diet, drinking more water can help ease constipation. This is because insoluble fiber absorbs water, making stools soft and bulky. Be sure to drink plenty of water throughout the day. Drinking fruit juices, such as prune juice or apple juice, can also help prevent constipation.  Get more exercise  Regular exercise aids digestion and helps prevent constipation. It s also great for your health. So talk with your healthcare provider about starting an exercise program. Low-impact activities, such as swimming or walking, are good places to start. Take it easy at first. And remember to drink plenty of water when you exercise.  High-fiber foods  High-fiber foods offer many benefits. By making your stools softer, they help heal and prevent swollen hemorrhoids. They may also help reduce the risk of colon and  rectal cancer. Best of all, they re usually low in calories and taste great. Here are some examples of fiber-rich foods.    Whole grains, such as wheat bran, corn bran, and brown rice.    Vegetables, especially carrots, broccoli, cabbage, and peas.    Fruits, such as apples, bananas, raisins, peaches, and pears.    Nuts and legumes, especially peanuts, lentils, and kidney beans.  Easy ways to add fiber  The tips below offer some simple ways to add more high-fiber foods to your meals.    Start your day with a high-fiber breakfast. Eat a wheat bran cereal along with a sliced banana. Or, try peanut butter on whole-wheat toast.    Eat carrot sticks for snacks. They re easy to prepare, taste great, and are low in calories.    Use whole-grain breads instead of white bread for sandwiches.    Eat fruits for treats. Try an apple and some raisins instead of a candy bar.   Date Last Reviewed: 7/1/2016 2000-2017 [employer]. [address] [place] [address], [place] [address]. All rights reserved. This information is not intended as a substitute for professional medical care. Always follow your healthcare professional's instructions.

## 2018-05-10 NOTE — PROGRESS NOTES
SUBJECTIVE:   Toya Mcnair is a 52 year old female who presents to clinic today for the following health issues:      URINARY TRACT SYMPTOMS  Onset: 2 days    Description:   Painful urination (Dysuria): no   Blood in urine (Hematuria): YES  Delay in urine (Hesitency): YES    Intensity: mild    Progression of Symptoms:  worsening    Accompanying Signs & Symptoms:  Fever/chills: no   Flank pain no   Nausea and vomiting: no   Any vaginal symptoms: bleeding, not sure if in urine or vaginal  Abdominal/Pelvic Pain: no     History:   History of frequent UTI's: no   History of kidney stones: no   Sexually Active: no   Possibility of pregnancy: No    Precipitating factors:   none    Therapies Tried and outcome: none    PROBLEMS TO ADD ON...  Anal fissure- was in ER 12/2018- symptoms are not any better- history of constipation but now having daily BM's. Using lidocaine cream but does not help- taking baths several times daily.     Problem list and histories reviewed & adjusted, as indicated.  Additional history: as documented    Patient Active Problem List   Diagnosis     Anxiety     CARDIOVASCULAR SCREENING; LDL GOAL LESS THAN 160     Tobacco abuse     Benign essential hypertension     Past Surgical History:   Procedure Laterality Date     SURGICAL HISTORY OF -       c section       Social History   Substance Use Topics     Smoking status: Current Every Day Smoker     Packs/day: 0.50     Types: Cigarettes     Smokeless tobacco: Never Used      Comment: cutting back     Alcohol use 0.0 oz/week     0 Standard drinks or equivalent per week      Comment: occ     Family History   Problem Relation Age of Onset     HEART DISEASE Mother      C.A.D. Mother      CANCER Maternal Grandmother      Unknown/Adopted Maternal Grandfather      Unknown/Adopted Paternal Grandmother      Unknown/Adopted Paternal Grandfather      CANCER Paternal Grandfather      Breast Cancer Sister      DIABETES Sister      C.A.D. Brother      Heart  "Transplant Brother 68     Heart Surgery Brother 60     triple bipass           Reviewed and updated as needed this visit by clinical staff       Reviewed and updated as needed this visit by Provider         ROS:  Constitutional, HEENT, cardiovascular, pulmonary, GI, , musculoskeletal, neuro, skin, endocrine and psych systems are negative, except as otherwise noted.    OBJECTIVE:     /82 (BP Location: Left arm, Patient Position: Chair, Cuff Size: Adult Regular)  Pulse 83  Temp 98.2  F (36.8  C) (Tympanic)  Ht 5' 3\" (1.6 m)  Wt 151 lb 12.8 oz (68.9 kg)  BMI 26.89 kg/m2  Body mass index is 26.89 kg/(m^2).  GENERAL: healthy, alert and no distress  RESP: lungs clear to auscultation - no rales, rhonchi or wheezes  CV: regular rate and rhythm, normal S1 S2, no S3 or S4, no murmur, click or rub, no peripheral edema and peripheral pulses strong  RECTAL (female): external hemorrhoid  MS: no gross musculoskeletal defects noted, no edema    Diagnostic Test Results:  Results for orders placed or performed in visit on 05/10/18 (from the past 24 hour(s))   *UA reflex to Microscopic and Culture ([place] and [facility] (except [place] and [place])   Result Value Ref Range    Color Urine Yellow     Appearance Urine Cloudy     Glucose Urine Negative NEG^Negative mg/dL    Bilirubin Urine Negative NEG^Negative    Ketones Urine Negative NEG^Negative mg/dL    Specific Gravity Urine 1.025 1.003 - 1.035    Blood Urine Moderate (A) NEG^Negative    pH Urine 6.0 5.0 - 7.0 pH    Protein Albumin Urine Negative NEG^Negative mg/dL    Urobilinogen Urine 1.0 0.2 - 1.0 EU/dL    Nitrite Urine Negative NEG^Negative    Leukocyte Esterase Urine Negative NEG^Negative    Source Midstream Urine    Urine Microscopic   Result Value Ref Range    WBC Urine 0 - 5 OTO5^0 - 5 /HPF    RBC Urine 2-5 (A) OTO2^O - 2 /HPF    Squamous Epithelial /LPF Urine Many (A) FEW^Few /LPF    Uric Acid Crystals Few (A) NEG^Negative /HPF       ASSESSMENT/PLAN: "         1. Symptoms involving urinary system  UA did not show any concerns for infection  - *UA reflex to Microscopic and Culture ([place] [facility] (except [place] and [place])  - Urine Microscopic    2. External hemorrhoids    - hydrocortisone (ANUSOL-HC) 2.5 % cream; Place rectally 2 times daily  Dispense: 30 g; Refill: 0  - GENERAL SURG ADULT REFERRAL    Patient Instructions     1. Schedule an appointment with surgery       Thank you for choosing [facility].  You may be receiving a survey in the mail from Albin Shaikh regarding your visit today.  Please take a few minutes to complete and return the survey to let us know how we are doing.      If you have questions or concerns, please contact us via [email or web address] or you can contact your care team at [phone].    Our Clinic hours are:  Monday 6:40 am  to 7:00 pm  Tuesday -Friday 6:40 am to 5:00 pm    The [place] outpatient lab hours are:  Monday - Friday 6:10 am to 4:45 pm  Saturdays 7:00 am to 11:00 am  Appointments are required, call [phone]    If you have clinical questions after hours or would like to schedule an appointment,  call the clinic at [phone].    Treating Hemorrhoids: Self-Care    Follow your healthcare provider s advice about caring for your hemorrhoids at home. Some treatments help relieve symptoms right away. Others involve making changes in your diet and exercise habits. These can help ease constipation and prevent hemorrhoid symptoms from coming back.  Relieving symptoms  Your healthcare provider may prescribe anti-inflammatory medicine to help ease your symptoms. The following tips will also help relieve pain and swelling.    Take sitz baths. Taking a sitz bath means sitting in a few inches of warm bath water. Soaking for 10 minutes twice a day can provide welcome relief from painful hemorrhoids. It can also help the area stay clean.    Develop good bowel habits. Use the bathroom when you need to. Don t ignore the  urge to move your bowels. This can lead to constipation, hard stools, and straining. Also, don t read while on the toilet. Sit only as long as needed. Wipe gently with soft, unscented toilet tissue or baby wipes.    Use ice packs. Placing an ice pack on a thrombosed external hemorrhoid can help relieve pain right away. It will also help reduce the blood clot. Use the ice for 15 to 20 minutes at a time. Keep a cloth between the ice and your skin to prevent skin damage.    Use other measures. Laxatives and enemas can help ease constipation. But use them only on your healthcare provider s advice. For symptom relief, try using cotton pads soaked in witch hazel. These are available at most drugstores. Over-the-counter hemorrhoid ointments and petroleum jelly can also provide relief.  Add fiber to your diet  Adding fiber to your diet can help relieve constipation by making stools softer and easier to pass. To increase your fiber intake, your healthcare provider may recommend a bulking agent, such as psyllium. This is a high-fiber supplement available at most grocery stores and drugstores. Eating more fiber-rich foods will also help. There are two types of fiber:    Insoluble fiber is the main ingredient in bulking agents. It s also found in foods such as wheat bran, whole-grain breads, fresh fruits, and vegetables.    Soluble fiber is found in foods such as oat bran. Although soluble fiber is good for you, it may not ease constipation as much as foods high in insoluble fiber.  Drink more water  Along with a high-fiber diet, drinking more water can help ease constipation. This is because insoluble fiber absorbs water, making stools soft and bulky. Be sure to drink plenty of water throughout the day. Drinking fruit juices, such as prune juice or apple juice, can also help prevent constipation.  Get more exercise  Regular exercise aids digestion and helps prevent constipation. It s also great for your health. So talk with  your healthcare provider about starting an exercise program. Low-impact activities, such as swimming or walking, are good places to start. Take it easy at first. And remember to drink plenty of water when you exercise.  High-fiber foods  High-fiber foods offer many benefits. By making your stools softer, they help heal and prevent swollen hemorrhoids. They may also help reduce the risk of colon and rectal cancer. Best of all, they re usually low in calories and taste great. Here are some examples of fiber-rich foods.    Whole grains, such as wheat bran, corn bran, and brown rice.    Vegetables, especially carrots, broccoli, cabbage, and peas.    Fruits, such as apples, bananas, raisins, peaches, and pears.    Nuts and legumes, especially peanuts, lentils, and kidney beans.  Easy ways to add fiber  The tips below offer some simple ways to add more high-fiber foods to your meals.    Start your day with a high-fiber breakfast. Eat a wheat bran cereal along with a sliced banana. Or, try peanut butter on whole-wheat toast.    Eat carrot sticks for snacks. They re easy to prepare, taste great, and are low in calories.    Use whole-grain breads instead of white bread for sandwiches.    Eat fruits for treats. Try an apple and some raisins instead of a candy bar.   Date Last Reviewed: 7/1/2016 2000-2017 [employer]. [address] [place] [address], [place] [address]. All rights reserved. This information is not intended as a substitute for professional medical care. Always follow your healthcare professional's instructions.            DANA Kenney CNP  [facility]

## 2018-05-10 NOTE — MR AVS SNAPSHOT
After Visit Summary   5/10/2018    Toya Mcnair    MRN: [id number]           Patient Information     Date Of Birth          1965        Visit Information        Provider Department      5/10/2018 2:00 PM Elizabet Rosales APRN CNP [facility]        Today's Diagnoses     Symptoms involving urinary system    -  1    External hemorrhoids          Care Instructions    1. Schedule an appointment with surgery       Thank you for choosing [facility].  You may be receiving a survey in the mail from Albin Shaikh regarding your visit today.  Please take a few minutes to complete and return the survey to let us know how we are doing.      If you have questions or concerns, please contact us via [employer] or you can contact your care team at [phone].    Our Clinic hours are:  Monday 6:40 am  to 7:00 pm  Tuesday -Friday 6:40 am to 5:00 pm    The [place] outpatient lab hours are:  Monday - Friday 6:10 am to 4:45 pm  Saturdays 7:00 am to 11:00 am  Appointments are required, call [phone]    If you have clinical questions after hours or would like to schedule an appointment,  call the clinic at [phone].    Treating Hemorrhoids: Self-Care    Follow your healthcare provider s advice about caring for your hemorrhoids at home. Some treatments help relieve symptoms right away. Others involve making changes in your diet and exercise habits. These can help ease constipation and prevent hemorrhoid symptoms from coming back.  Relieving symptoms  Your healthcare provider may prescribe anti-inflammatory medicine to help ease your symptoms. The following tips will also help relieve pain and swelling.    Take sitz baths. Taking a sitz bath means sitting in a few inches of warm bath water. Soaking for 10 minutes twice a day can provide welcome relief from painful hemorrhoids. It can also help the area stay clean.    Develop good bowel habits. Use the bathroom when you need to. Don t ignore  the urge to move your bowels. This can lead to constipation, hard stools, and straining. Also, don t read while on the toilet. Sit only as long as needed. Wipe gently with soft, unscented toilet tissue or baby wipes.    Use ice packs. Placing an ice pack on a thrombosed external hemorrhoid can help relieve pain right away. It will also help reduce the blood clot. Use the ice for 15 to 20 minutes at a time. Keep a cloth between the ice and your skin to prevent skin damage.    Use other measures. Laxatives and enemas can help ease constipation. But use them only on your healthcare provider s advice. For symptom relief, try using cotton pads soaked in witch hazel. These are available at most drugstores. Over-the-counter hemorrhoid ointments and petroleum jelly can also provide relief.  Add fiber to your diet  Adding fiber to your diet can help relieve constipation by making stools softer and easier to pass. To increase your fiber intake, your healthcare provider may recommend a bulking agent, such as psyllium. This is a high-fiber supplement available at most grocery stores and drugstores. Eating more fiber-rich foods will also help. There are two types of fiber:    Insoluble fiber is the main ingredient in bulking agents. It s also found in foods such as wheat bran, whole-grain breads, fresh fruits, and vegetables.    Soluble fiber is found in foods such as oat bran. Although soluble fiber is good for you, it may not ease constipation as much as foods high in insoluble fiber.  Drink more water  Along with a high-fiber diet, drinking more water can help ease constipation. This is because insoluble fiber absorbs water, making stools soft and bulky. Be sure to drink plenty of water throughout the day. Drinking fruit juices, such as prune juice or apple juice, can also help prevent constipation.  Get more exercise  Regular exercise aids digestion and helps prevent constipation. It s also great for your health. So talk  with your healthcare provider about starting an exercise program. Low-impact activities, such as swimming or walking, are good places to start. Take it easy at first. And remember to drink plenty of water when you exercise.  High-fiber foods  High-fiber foods offer many benefits. By making your stools softer, they help heal and prevent swollen hemorrhoids. They may also help reduce the risk of colon and rectal cancer. Best of all, they re usually low in calories and taste great. Here are some examples of fiber-rich foods.    Whole grains, such as wheat bran, corn bran, and brown rice.    Vegetables, especially carrots, broccoli, cabbage, and peas.    Fruits, such as apples, bananas, raisins, peaches, and pears.    Nuts and legumes, especially peanuts, lentils, and kidney beans.  Easy ways to add fiber  The tips below offer some simple ways to add more high-fiber foods to your meals.    Start your day with a high-fiber breakfast. Eat a wheat bran cereal along with a sliced banana. Or, try peanut butter on whole-wheat toast.    Eat carrot sticks for snacks. They re easy to prepare, taste great, and are low in calories.    Use whole-grain breads instead of white bread for sandwiches.    Eat fruits for treats. Try an apple and some raisins instead of a candy bar.   Date Last Reviewed: 7/1/2016 2000-2017 [employer]. [address] [place] [address]. All rights reserved. This information is not intended as a substitute for professional medical care. Always follow your healthcare professional's instructions.                Follow-ups after your visit        Additional Services     GENERAL SURG ADULT REFERRAL       Your provider has referred you to: FM: [facility] [phone]   [email or web address]    Please be aware that coverage of these services is subject to the terms and limitations of your health insurance plan.  Call member services at  "your health plan with any benefit or coverage questions.      Please bring the following with you to your appointment:    (1) Any X-Rays, CTs or MRIs which have been performed.  Contact the facility where they were done to arrange for  prior to your scheduled appointment.   (2) List of current medications   (3) This referral request   (4) Any documents/labs given to you for this referral                  Who to contact     If you have questions or need follow up information about today's clinic visit or your schedule please contact [facility] directly at [phone].  Normal or non-critical lab and imaging results will be communicated to you by [employer], letter or phone within 4 business days after the clinic has received the results. If you do not hear from us within 7 days, please contact the clinic through [employer] or phone. If you have a critical or abnormal lab result, we will notify you by phone as soon as possible.  Submit refill requests through [employer] or call your pharmacy and they will forward the refill request to us. Please allow 3 business days for your refill to be completed.          Additional Information About Your Visit        MyChart Information     [employer] lets you send messages to your doctor, view your test results, renew your prescriptions, schedule appointments and more. To sign up, go to [email or web address] . Click on \"Log in\" on the left side of the screen, which will take you to the Welcome page. Then click on \"Sign up Now\" on the right side of the page.     You will be asked to enter the access code listed below, as well as some personal information. Please follow the directions to create your username and password.     Your access code is: [email or web address]  Expires: 2018  2:29 PM     Your access code will  in 90 days. If you need help or a new code, please call your [facility] or [phone].        Care EveryWhere ID     This is your Care EveryWhere " "ID. This could be used by other organizations to access your [facility] medical records  [id number]        Your Vitals Were     Pulse Temperature Height BMI (Body Mass Index)          83 98.2  F (36.8  C) (Tympanic) 5' 3\" (1.6 m) 26.89 kg/m2         Blood Pressure from Last 3 Encounters:   05/10/18 136/82   12/14/17 108/77   06/23/17 138/88    Weight from Last 3 Encounters:   05/10/18 151 lb 12.8 oz (68.9 kg)   12/14/17 150 lb (68 kg)   06/23/17 152 lb (68.9 kg)              We Performed the Following     *UA reflex to Microscopic and Culture ([place] and [facility] (except [place] and [place])     GENERAL SURG ADULT REFERRAL     Urine Microscopic          Today's Medication Changes          These changes are accurate as of 5/10/18  2:29 PM.  If you have any questions, ask your nurse or doctor.               Start taking these medicines.        Dose/Directions    hydrocortisone 2.5 % cream   Commonly known as:  ANUSOL-HC   Used for:  External hemorrhoids   Started by:  Elizabet Rosales APRN CNP        Place rectally 2 times daily   Quantity:  30 g   Refills:  0            Where to get your medicines      These medications were sent to [facility] [address] [address]     Phone:  [phone]     hydrocortisone 2.5 % cream                Primary Care Provider Office Phone # Fax #    DANA Kenney [phone]       [facility] [address]        Equal Access to Services     FAN GUILLAUME : Hadii remi mcfarland Solaine, waaxda luqadaha, qaybta kaalmada reva, rosaura serna. So [facility] [phone].    ATENCIÓN: Si habla español, tiene a lopez disposición servicios gratuitos de asistencia lingüística. Llame al [phone].    We comply with applicable federal civil rights laws and [place] laws. We do not discriminate on the basis of race, color, national origin, age, " disability, sex, sexual orientation, or gender identity.            Thank you!     Thank you for choosing [facility]  for your care. Our goal is always to provide you with excellent care. Hearing back from our patients is one way we can continue to improve our services. Please take a few minutes to complete the written survey that you may receive in the mail after your visit with us. Thank you!             Your Updated Medication List - Protect others around you: Learn how to safely use, store and throw away your medicines at www.disposemymeds.org.          This list is accurate as of 5/10/18  2:29 PM.  Always use your most recent med list.                   Brand Name Dispense Instructions for use Diagnosis    albuterol 108 (90 Base) MCG/ACT Inhaler    PROAIR HFA/PROVENTIL HFA/VENTOLIN HFA    1 Inhaler    INHALE 1-2 PUFFS EVERY 4-6 HOURS AS NEEDED    Tobacco abuse       atenolol-chlorthalidone 100-25 MG per tablet    TENORETIC 100    90 tablet    Take 1 tablet by mouth daily    Benign essential hypertension       GLYCERYL TRINITRATE OINTMENT 0.2%     30 g    Place 1 applicator rectally 2 times daily        hydrocortisone 2.5 % cream    ANUSOL-HC    30 g    Place rectally 2 times daily    External hemorrhoids       lidocaine 5 % ointment    XYLOCAINE    30 g    Apply topically as needed for moderate pain (at anal area)        lisinopril 10 MG tablet    PRINIVIL/ZESTRIL    90 tablet    Take 1 tablet (10 mg) by mouth daily    Benign essential hypertension       WOMENS ONE DAILY Tabs      Take 1 tablet by mouth daily.

## 2018-05-14 ENCOUNTER — OFFICE VISIT (OUTPATIENT)
Dept: SURGERY | Facility: CLINIC | Age: 53
End: 2018-05-14
Payer: COMMERCIAL

## 2018-05-14 VITALS
SYSTOLIC BLOOD PRESSURE: 129 MMHG | DIASTOLIC BLOOD PRESSURE: 85 MMHG | HEIGHT: 63 IN | TEMPERATURE: 98.8 F | BODY MASS INDEX: 26.75 KG/M2 | WEIGHT: 151 LBS | HEART RATE: 71 BPM

## 2018-05-14 DIAGNOSIS — K60.2 ANAL FISSURE: Primary | ICD-10-CM

## 2018-05-14 PROCEDURE — 99203 OFFICE O/P NEW LOW 30 MIN: CPT | Performed by: SURGERY

## 2018-05-14 ASSESSMENT — PAIN SCALES - GENERAL: PAINLEVEL: MILD PAIN (3)

## 2018-05-14 NOTE — NURSING NOTE
"Initial /85 (BP Location: Left arm, Patient Position: Sitting, Cuff Size: Adult Regular)  Pulse 71  Temp 98.8  F (37.1  C) (Oral)  Ht 1.6 m (5' 3\")  Wt 68.5 kg (151 lb)  BMI 26.75 kg/m2 Estimated body mass index is 26.75 kg/(m^2) as calculated from the following:    Height as of this encounter: 1.6 m (5' 3\").    Weight as of this encounter: 68.5 kg (151 lb). .    Svetlana Baker CMA    "

## 2018-05-14 NOTE — LETTER
5/14/2018         RE: Toya Mcnair  [address] [facility] [id number]        Dear Colleague,    Thank you for referring your patient, Toya Mcnair, to the [facility]. Please see a copy of my visit note below.    PCP:  Elizabet Rosales    Chief complaint: Rectal pain, possible hemorrhoid    History of Present Illness: This healthy 52-year-old female presented to the clinic for evaluation of rectal pain. She reports that sometime in January of this year she started developing pain in the rectum which was described as quite severe. She thought she perhaps had a hemorrhoid so she was treating herself with stool softeners and I believe additional fiber, but I'm not sure of that part. Her pain has been persistent. She has pain with each bowel movement, and in fact almost 3 years bowel movement because of the pain she's going to have. She also has bleeding with bowel movements.    She was seen by her primary care provider and referred here for possible external hemorrhoid disease.    She has not had a colonoscopy yet, but that is in her plans.    Histories:  Past Medical History:   Diagnosis Date     NO ACTIVE PROBLEMS        Past Surgical History:   Procedure Laterality Date     SURGICAL HISTORY OF -       c section       Family History   Problem Relation Age of Onset     HEART DISEASE Mother      C.A.D. Mother      CANCER Maternal Grandmother      Unknown/Adopted Maternal Grandfather      Unknown/Adopted Paternal Grandmother      Unknown/Adopted Paternal Grandfather      CANCER Paternal Grandfather      Breast Cancer Sister      DIABETES Sister      C.A.D. Brother      Heart Transplant Brother 68     Heart Surgery Brother 60     triple bipass       Social History   Substance Use Topics     Smoking status: Current Every Day Smoker     Packs/day: 0.50     Types: Cigarettes     Smokeless tobacco: Never Used      Comment: cutting back     Alcohol use 0.0 oz/week     0 Standard drinks or  equivalent per week      Comment: occ       Current Outpatient Prescriptions   Medication Sig Dispense Refill     COMPOUNDED NON-CONTROLLED SUBSTANCE (CMPD RX) - PHARMACY TO MIX COMPOUNDED MEDICATION Place 1 applicator rectally 2 times daily 30 g 1     albuterol (PROAIR HFA, PROVENTIL HFA, VENTOLIN HFA) 108 (90 BASE) MCG/ACT inhaler INHALE 1-2 PUFFS EVERY 4-6 HOURS AS NEEDED 1 Inhaler 2     atenolol-chlorthalidone (TENORETIC 100) 100-25 MG per tablet Take 1 tablet by mouth daily 90 tablet 3     hydrocortisone (ANUSOL-HC) 2.5 % cream Place rectally 2 times daily 30 g 0     lidocaine (XYLOCAINE) 5 % ointment Apply topically as needed for moderate pain (at anal area) 30 g 0     lisinopril (PRINIVIL/ZESTRIL) 10 MG tablet Take 1 tablet (10 mg) by mouth daily 90 tablet 3     Multiple Vitamins-Minerals (WOMENS ONE DAILY) TABS Take 1 tablet by mouth daily.       Nitroglycerin (GLYCERYL TRINITRATE OINTMENT 0.2%) Place 1 applicator rectally 2 times daily 30 g 0       Allergies   Allergen Reactions     Vioxx Rash       Images:  No results found for this or any previous visit (from the past 744 hour(s)).    Labs:  Results for orders placed or performed in visit on 05/10/18   *UA reflex to Microscopic and Culture ([place] [facility] (except [place] and [place])   Result Value Ref Range    Color Urine Yellow     Appearance Urine Cloudy     Glucose Urine Negative NEG^Negative mg/dL    Bilirubin Urine Negative NEG^Negative    Ketones Urine Negative NEG^Negative mg/dL    Specific Gravity Urine 1.025 1.003 - 1.035    Blood Urine Moderate (A) NEG^Negative    pH Urine 6.0 5.0 - 7.0 pH    Protein Albumin Urine Negative NEG^Negative mg/dL    Urobilinogen Urine 1.0 0.2 - 1.0 EU/dL    Nitrite Urine Negative NEG^Negative    Leukocyte Esterase Urine Negative NEG^Negative    Source Midstream Urine    Urine Microscopic   Result Value Ref Range    WBC Urine 0 - 5 OTO5^0 - 5 /HPF    RBC Urine 2-5 (A) OTO2^O - 2 /HPF    Squamous  "Epithelial /LPF Urine Many (A) FEW^Few /LPF    Uric Acid Crystals Few (A) NEG^Negative /HPF       ROS:  Constitutional - Denies fevers, weight loss, malaise, lethargy  Neuro - Denies tremors or seizures  Pulmon - Denies SOB, dyspnea, hemoptysis, chronic cough or use of an inhaler  CV - Denies CP, SOB, lower extremity edema, difficulty w/ stairs, has never used NTG  GI - Denies hematemesis, melena, chronic diarrhea or epigastric pain   - Denies hematuria, difficulty voiding, h/o STDs  Hematology - Denies blood clotting disorders, chronic anemias  Dermatology - No melanomas or skin cancers  Rheumatology - No h/o RA  Pysch - Denies depression, bipolar d/o or schizophrenia    /85 (BP Location: Left arm, Patient Position: Sitting, Cuff Size: Adult Regular)  Pulse 71  Temp 98.8  F (37.1  C) (Oral)  Ht 1.6 m (5' 3\")  Wt 68.5 kg (151 lb)  BMI 26.75 kg/m2    Exam:  General - Alert and Oriented X4, NAD, well nourished  HEENT - Normocephalic, atraumatic  Neck - supple, no LAD  Lungs -respirations unlabored, chest wall excursion normal   CV - Heart RRR  Abdomen - Soft, non-tender,   Groins -deferred  Rectal -external anatomy normal, no hemorrhoids noted, no fistulas, no evidence of abscess digital exam deferred due to extreme pain   Neuro - Full ROM, Strength 5/5 and major muscle groups, sensation intact  Extremities - No cyanosis, clubbing or edema.      Assessment and Plan: Her history and exam are entirely consistent with anal fissure. There is clearly no infection present and no fistulas. There is no thrombosed hemorrhoids.    I spent about 30 minutes with her discussing the pathophysiology of fissures including the anatomy and treatment. My recommendation is that we start her on nifedipine gel and if that doesn't work after solid month of used and we should consider Botox injection or even surgical sphincterotomy. Her to use the gel religiously, and if she has response in the month she should let me " know.    Kyle Fairbanks MD FACS        Kyle Fairbanks MD             Again, thank you for allowing me to participate in the care of your patient.        Sincerely,        Kyle Fairbanks MD

## 2018-05-14 NOTE — PROGRESS NOTES
PCP:  Elizabet Rosales    Chief complaint: Rectal pain, possible hemorrhoid    History of Present Illness: This healthy 52-year-old female presented to the clinic for evaluation of rectal pain. She reports that sometime in January of this year she started developing pain in the rectum which was described as quite severe. She thought she perhaps had a hemorrhoid so she was treating herself with stool softeners and I believe additional fiber, but I'm not sure of that part. Her pain has been persistent. She has pain with each bowel movement, and in fact almost 3 years bowel movement because of the pain she's going to have. She also has bleeding with bowel movements.    She was seen by her primary care provider and referred here for possible external hemorrhoid disease.    She has not had a colonoscopy yet, but that is in her plans.    Histories:  Past Medical History:   Diagnosis Date     NO ACTIVE PROBLEMS        Past Surgical History:   Procedure Laterality Date     SURGICAL HISTORY OF -       c section       Family History   Problem Relation Age of Onset     HEART DISEASE Mother      C.A.D. Mother      CANCER Maternal Grandmother      Unknown/Adopted Maternal Grandfather      Unknown/Adopted Paternal Grandmother      Unknown/Adopted Paternal Grandfather      CANCER Paternal Grandfather      Breast Cancer Sister      DIABETES Sister      C.A.D. Brother      Heart Transplant Brother 68     Heart Surgery Brother 60     triple bipass       Social History   Substance Use Topics     Smoking status: Current Every Day Smoker     Packs/day: 0.50     Types: Cigarettes     Smokeless tobacco: Never Used      Comment: cutting back     Alcohol use 0.0 oz/week     0 Standard drinks or equivalent per week      Comment: occ       Current Outpatient Prescriptions   Medication Sig Dispense Refill     COMPOUNDED NON-CONTROLLED SUBSTANCE (CMPD RX) - PHARMACY TO MIX COMPOUNDED MEDICATION Place 1 applicator rectally 2 times daily 30 g 1      albuterol (PROAIR HFA, PROVENTIL HFA, VENTOLIN HFA) 108 (90 BASE) MCG/ACT inhaler INHALE 1-2 PUFFS EVERY 4-6 HOURS AS NEEDED 1 Inhaler 2     atenolol-chlorthalidone (TENORETIC 100) 100-25 MG per tablet Take 1 tablet by mouth daily 90 tablet 3     hydrocortisone (ANUSOL-HC) 2.5 % cream Place rectally 2 times daily 30 g 0     lidocaine (XYLOCAINE) 5 % ointment Apply topically as needed for moderate pain (at anal area) 30 g 0     lisinopril (PRINIVIL/ZESTRIL) 10 MG tablet Take 1 tablet (10 mg) by mouth daily 90 tablet 3     Multiple Vitamins-Minerals (WOMENS ONE DAILY) TABS Take 1 tablet by mouth daily.       Nitroglycerin (GLYCERYL TRINITRATE OINTMENT 0.2%) Place 1 applicator rectally 2 times daily 30 g 0       Allergies   Allergen Reactions     Vioxx Rash       Images:  No results found for this or any previous visit (from the past 744 hour(s)).    Labs:  Results for orders placed or performed in visit on 05/10/18   *UA reflex to Microscopic and Culture ([place] [facility] (except [place] and [place])   Result Value Ref Range    Color Urine Yellow     Appearance Urine Cloudy     Glucose Urine Negative NEG^Negative mg/dL    Bilirubin Urine Negative NEG^Negative    Ketones Urine Negative NEG^Negative mg/dL    Specific Gravity Urine 1.025 1.003 - 1.035    Blood Urine Moderate (A) NEG^Negative    pH Urine 6.0 5.0 - 7.0 pH    Protein Albumin Urine Negative NEG^Negative mg/dL    Urobilinogen Urine 1.0 0.2 - 1.0 EU/dL    Nitrite Urine Negative NEG^Negative    Leukocyte Esterase Urine Negative NEG^Negative    Source Midstream Urine    Urine Microscopic   Result Value Ref Range    WBC Urine 0 - 5 OTO5^0 - 5 /HPF    RBC Urine 2-5 (A) OTO2^O - 2 /HPF    Squamous Epithelial /LPF Urine Many (A) FEW^Few /LPF    Uric Acid Crystals Few (A) NEG^Negative /HPF       ROS:  Constitutional - Denies fevers, weight loss, malaise, lethargy  Neuro - Denies tremors or seizures  Pulmon - Denies SOB, dyspnea, hemoptysis, chronic  "cough or use of an inhaler  CV - Denies CP, SOB, lower extremity edema, difficulty w/ stairs, has never used NTG  GI - Denies hematemesis, melena, chronic diarrhea or epigastric pain   - Denies hematuria, difficulty voiding, h/o STDs  Hematology - Denies blood clotting disorders, chronic anemias  Dermatology - No melanomas or skin cancers  Rheumatology - No h/o RA  Pysch - Denies depression, bipolar d/o or schizophrenia    /85 (BP Location: Left arm, Patient Position: Sitting, Cuff Size: Adult Regular)  Pulse 71  Temp 98.8  F (37.1  C) (Oral)  Ht 1.6 m (5' 3\")  Wt 68.5 kg (151 lb)  BMI 26.75 kg/m2    Exam:  General - Alert and Oriented X4, NAD, well nourished  HEENT - Normocephalic, atraumatic  Neck - supple, no LAD  Lungs -respirations unlabored, chest wall excursion normal   CV - Heart RRR  Abdomen - Soft, non-tender,   Groins -deferred  Rectal -external anatomy normal, no hemorrhoids noted, no fistulas, no evidence of abscess digital exam deferred due to extreme pain   Neuro - Full ROM, Strength 5/5 and major muscle groups, sensation intact  Extremities - No cyanosis, clubbing or edema.      Assessment and Plan: Her history and exam are entirely consistent with anal fissure. There is clearly no infection present and no fistulas. There is no thrombosed hemorrhoids.    I spent about 30 minutes with her discussing the pathophysiology of fissures including the anatomy and treatment. My recommendation is that we start her on nifedipine gel and if that doesn't work after solid month of used and we should consider Botox injection or even surgical sphincterotomy. Her to use the gel religiously, and if she has response in the month she should let me know.    Kyle Fairbanks MD FACS        Kyle Fairbanks MD           "

## 2018-05-14 NOTE — MR AVS SNAPSHOT
"              After Visit Summary   2018    Toya Mcnair    MRN: [id number]           Patient Information     Date Of Birth          1965        Visit Information        Provider Department      2018 2:00 PM Kyle Fairbanks MD [facility]        Today's Diagnoses     Anal fissure    -  1      Care Instructions    Per Physician's instructions            Follow-ups after your visit        Who to contact     If you have questions or need follow up information about today's clinic visit or your schedule please contact [facility] directly at [phone].  Normal or non-critical lab and imaging results will be communicated to you by [email or web address], letter or phone within 4 business days after the clinic has received the results. If you do not hear from us within 7 days, please contact the clinic through [email or web address] or phone. If you have a critical or abnormal lab result, we will notify you by phone as soon as possible.  Submit refill requests through [employer] or call your pharmacy and they will forward the refill request to us. Please allow 3 business days for your refill to be completed.          Additional Information About Your Visit        MyChart Information     [employer] lets you send messages to your doctor, view your test results, renew your prescriptions, schedule appointments and more. To sign up, go to [email or web address] . Click on \"Log in\" on the left side of the screen, which will take you to the Welcome page. Then click on \"Sign up Now\" on the right side of the page.     You will be asked to enter the access code listed below, as well as some personal information. Please follow the directions to create your username and password.     Your access code is: [email or web address]  Expires: 2018  2:29 PM     Your access code will  in 90 days. If you need help or a new code, please call your [facility] or [phone].        Care EveryWhere ID     This is your Care " "EveryWhere ID. This could be used by other organizations to access your [facility] medical records  [id number]        Your Vitals Were     Pulse Temperature Height BMI (Body Mass Index)          71 98.8  F (37.1  C) (Oral) 1.6 m (5' 3\") 26.75 kg/m2         Blood Pressure from Last 3 Encounters:   05/14/18 129/85   05/10/18 136/82   12/14/17 108/77    Weight from Last 3 Encounters:   05/14/18 68.5 kg (151 lb)   05/10/18 68.9 kg (151 lb 12.8 oz)   12/14/17 68 kg (150 lb)              Today, you had the following     No orders found for display         Today's Medication Changes          These changes are accurate as of 5/14/18  4:21 PM.  If you have any questions, ask your nurse or doctor.               Start taking these medicines.        Dose/Directions    COMPOUNDED NON-CONTROLLED SUBSTANCE - PHARMACY TO MIX COMPOUNDED MEDICATION   Commonly known as:  CMPD RX   Used for:  Anal fissure   Started by:  Kyle Fairbanks MD        Dose:  1 applicator   Place 1 applicator rectally 2 times daily   Quantity:  30 g   Refills:  1            Where to get your medicines      These medications were sent to [facility] [address]  [address] [address]     Phone:  [phone]     COMPOUNDED NON-CONTROLLED SUBSTANCE - PHARMACY TO MIX COMPOUNDED MEDICATION                Primary Care Provider Office Phone # Fax #    Elizabet Guevarat, APRN [facility] [phone]       [address] [facility] [address]        Equal Access to Services     ABBIE GUILLAUME AH: Hadii remi espino hadasho Soomaali, waaxda luqadaha, qaybta kaalmada adeegyabooker, waxay idiin hayaan adeeg kharash la'aan . So [facility] [phone].    ATENCIÓN: Si habla español, tiene a lopez disposición servicios gratuitos de asistencia lingüística. Llame al [phone].    We comply with applicable federal civil rights laws and [place] laws. We do not discriminate on the basis of race, color, national origin, age, disability, " sex, sexual orientation, or gender identity.            Thank you!     Thank you for choosing [facility]  for your care. Our goal is always to provide you with excellent care. Hearing back from our patients is one way we can continue to improve our services. Please take a few minutes to complete the written survey that you may receive in the mail after your visit with us. Thank you!             Your Updated Medication List - Protect others around you: Learn how to safely use, store and throw away your medicines at www.disposemymeds.org.          This list is accurate as of 5/14/18  4:21 PM.  Always use your most recent med list.                   Brand Name Dispense Instructions for use Diagnosis    albuterol 108 (90 Base) MCG/ACT Inhaler    PROAIR HFA/PROVENTIL HFA/VENTOLIN HFA    1 Inhaler    INHALE 1-2 PUFFS EVERY 4-6 HOURS AS NEEDED    Tobacco abuse       atenolol-chlorthalidone 100-25 MG per tablet    TENORETIC 100    90 tablet    Take 1 tablet by mouth daily    Benign essential hypertension       COMPOUNDED NON-CONTROLLED SUBSTANCE - PHARMACY TO MIX COMPOUNDED MEDICATION    CMPD RX    30 g    Place 1 applicator rectally 2 times daily    Anal fissure       GLYCERYL TRINITRATE OINTMENT 0.2%     30 g    Place 1 applicator rectally 2 times daily        hydrocortisone 2.5 % cream    ANUSOL-HC    30 g    Place rectally 2 times daily    External hemorrhoids       lidocaine 5 % ointment    XYLOCAINE    30 g    Apply topically as needed for moderate pain (at anal area)        lisinopril 10 MG tablet    PRINIVIL/ZESTRIL    90 tablet    Take 1 tablet (10 mg) by mouth daily    Benign essential hypertension       WOMENS ONE DAILY Tabs      Take 1 tablet by mouth daily.

## 2018-06-28 ENCOUNTER — TELEPHONE (OUTPATIENT)
Dept: FAMILY MEDICINE | Facility: CLINIC | Age: 53
End: 2018-06-28

## 2018-06-28 DIAGNOSIS — I10 BENIGN ESSENTIAL HYPERTENSION: ICD-10-CM

## 2018-06-28 NOTE — TELEPHONE ENCOUNTER
"Requested Prescriptions   Pending Prescriptions Disp Refills     lisinopril (PRINIVIL/ZESTRIL) 10 MG tablet [Pharmacy Med Name: LISINOPRIL 10 MG    TAB SOLC]  Last Written Prescription Date:  6/23/2017  Last Fill Quantity: 90,  # refills: 3   Last office visit: 5/10/2018 with prescribing provider:  Connie   Future Office Visit:     30 tablet 2     Sig: TAKE 1 TABLET (10 MG) BY MOUTH DAILY    ACE Inhibitors (Including Combos) Protocol Failed    6/28/2018  3:17 PM       Failed - Normal serum creatinine on file in past 12 months    Recent Labs   Lab Test  07/19/17   1309   CR  1.05*            Passed - Blood pressure under 140/90 in past 12 months    BP Readings from Last 3 Encounters:   05/14/18 129/85   05/10/18 136/82   12/14/17 108/77                Passed - Recent (12 mo) or future (30 days) visit within the authorizing provider's specialty    Patient had office visit in the last 12 months or has a visit in the next 30 days with authorizing provider or within the authorizing provider's specialty.  See \"Patient Info\" tab in inbasket, or \"Choose Columns\" in Meds & Orders section of the refill encounter.           Passed - Patient is age 18 or older       Passed - No active pregnancy on record       Passed - Normal serum potassium on file in past 12 months    Recent Labs   Lab Test  07/19/17   1309   POTASSIUM  3.8            Passed - No positive pregnancy test in past 12 months        atenolol-chlorthalidone (TENORETIC 100) 100-25 MG per tablet  Last Written Prescription Date:  6/23/2017  Last Fill Quantity: 90,  # refills: 3   Last office visit: 5/10/2018 with prescribing provider:  Connie   Future Office Visit:     90 tablet 3     Sig: Take 1 tablet by mouth daily    Beta-Blockers Protocol Passed    6/28/2018  3:17 PM       Passed - Blood pressure under 140/90 in past 12 months    BP Readings from Last 3 Encounters:   05/14/18 129/85   05/10/18 136/82   12/14/17 108/77                Passed - Patient is age 6 or older " "      Passed - Recent (12 mo) or future (30 days) visit within the authorizing provider's specialty    Patient had office visit in the last 12 months or has a visit in the next 30 days with authorizing provider or within the authorizing provider's specialty.  See \"Patient Info\" tab in inbasket, or \"Choose Columns\" in Meds & Orders section of the refill encounter.              "

## 2018-06-29 NOTE — TELEPHONE ENCOUNTER
Routing refill request to provider for review/approval because:  Labs out of range:  Creatinine     Cleopatra SEGURA RN

## 2018-07-02 RX ORDER — LISINOPRIL 10 MG/1
TABLET ORAL
Qty: 7 TABLET | Refills: 0 | Status: SHIPPED | OUTPATIENT
Start: 2018-07-02 | End: 2018-07-19

## 2018-07-02 RX ORDER — ATENOLOL AND CHLORTHALIDONE TABLET 100; 25 MG/1; MG/1
1 TABLET ORAL DAILY
Qty: 7 TABLET | Refills: 0 | Status: SHIPPED | OUTPATIENT
Start: 2018-07-02 | End: 2018-07-19

## 2018-07-02 NOTE — TELEPHONE ENCOUNTER
Message left for patient to return call to clinic.  CSS - ok to deliver message below.    Norma PARKER RN

## 2018-07-19 ENCOUNTER — OFFICE VISIT (OUTPATIENT)
Dept: FAMILY MEDICINE | Facility: CLINIC | Age: 53
End: 2018-07-19
Payer: COMMERCIAL

## 2018-07-19 VITALS
HEART RATE: 76 BPM | WEIGHT: 153 LBS | HEIGHT: 63 IN | DIASTOLIC BLOOD PRESSURE: 74 MMHG | BODY MASS INDEX: 27.11 KG/M2 | TEMPERATURE: 99.4 F | SYSTOLIC BLOOD PRESSURE: 136 MMHG | OXYGEN SATURATION: 100 % | RESPIRATION RATE: 14 BRPM

## 2018-07-19 DIAGNOSIS — J06.9 VIRAL URI: ICD-10-CM

## 2018-07-19 DIAGNOSIS — I10 BENIGN ESSENTIAL HYPERTENSION: Primary | ICD-10-CM

## 2018-07-19 LAB
ANION GAP SERPL CALCULATED.3IONS-SCNC: 9 MMOL/L (ref 3–14)
BUN SERPL-MCNC: 13 MG/DL (ref 7–30)
CALCIUM SERPL-MCNC: 9.2 MG/DL (ref 8.5–10.1)
CHLORIDE SERPL-SCNC: 94 MMOL/L (ref 94–109)
CO2 SERPL-SCNC: 26 MMOL/L (ref 20–32)
CREAT SERPL-MCNC: 1.05 MG/DL (ref 0.52–1.04)
GFR SERPL CREATININE-BSD FRML MDRD: 55 ML/MIN/1.7M2
GLUCOSE SERPL-MCNC: 70 MG/DL (ref 70–99)
POTASSIUM SERPL-SCNC: 3.6 MMOL/L (ref 3.4–5.3)
SODIUM SERPL-SCNC: 129 MMOL/L (ref 133–144)

## 2018-07-19 PROCEDURE — 36415 COLL VENOUS BLD VENIPUNCTURE: CPT | Performed by: NURSE PRACTITIONER

## 2018-07-19 PROCEDURE — 80048 BASIC METABOLIC PNL TOTAL CA: CPT | Performed by: NURSE PRACTITIONER

## 2018-07-19 PROCEDURE — 99214 OFFICE O/P EST MOD 30 MIN: CPT | Performed by: NURSE PRACTITIONER

## 2018-07-19 RX ORDER — LISINOPRIL 10 MG/1
TABLET ORAL
Qty: 30 TABLET | Refills: 11 | Status: SHIPPED | OUTPATIENT
Start: 2018-07-19 | End: 2018-07-20

## 2018-07-19 RX ORDER — ATENOLOL AND CHLORTHALIDONE TABLET 100; 25 MG/1; MG/1
1 TABLET ORAL DAILY
Qty: 30 TABLET | Refills: 11 | Status: SHIPPED | OUTPATIENT
Start: 2018-07-19 | End: 2018-07-20

## 2018-07-19 NOTE — NURSING NOTE
"Chief Complaint   Patient presents with     Hypertension     Blood Draw     Sinus Problem       Initial /74 (BP Location: Right arm, Patient Position: Chair, Cuff Size: Adult Regular)  Pulse 76  Temp 99.4  F (37.4  C) (Tympanic)  Resp 14  Ht 5' 3\" (1.6 m)  Wt 153 lb (69.4 kg)  SpO2 100%  BMI 27.1 kg/m2 Estimated body mass index is 27.1 kg/(m^2) as calculated from the following:    Height as of this encounter: 5' 3\" (1.6 m).    Weight as of this encounter: 153 lb (69.4 kg).    Medication Reconciliation: complete  Kacey Fang MA    "

## 2018-07-19 NOTE — PROGRESS NOTES
"  SUBJECTIVE:   Toya Mcnair is a 53 year old female who presents to clinic today for the following health issues:    Chief Complaint   Patient presents with     Hypertension     Blood Draw     Sinus Problem     Hypertension Follow-up    Outpatient blood pressures are being checked at home.  Results are 130/80.    Low Salt Diet: low salt    Amount of exercise or physical activity: None outside of work. Working four days a week.    Problems taking medications regularly: No    Medication side effects: none    Diet: regular (no restrictions)        ENT Symptoms           Symptoms: cc Present Absent Comment   Fever/Chills  x  Low grade   Fatigue   x    Muscle Aches   x    Eye Irritation   x    Sneezing   x    Nasal Kirby/Drg  x     Sinus Pressure/Pain  x     Loss of smell   x    Dental pain   x    Sore Throat   x    Swollen Glands   x    Ear Pain/Fullness  x  Bilateral    Cough   x    Wheeze   x    Chest Pain   x    Shortness of breath   x    Rash   x    Other   x      Symptom duration:  x3 days   Symptom severity:  moderate   Treatments tried:  ibuprofen   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         Problem list and histories reviewed & adjusted, as indicated.  Additional history: as documented    Reviewed and updated as needed this visit by clinical staff  Tobacco  Allergies  Med Hx  Surg Hx  Fam Hx  Soc Hx      Reviewed and updated as needed this visit by Provider         ROS:  Constitutional, HEENT, cardiovascular, pulmonary, gi and gu systems are negative, except as otherwise noted.    OBJECTIVE:     /74 (BP Location: Right arm, Patient Position: Chair, Cuff Size: Adult Regular)  Pulse 76  Temp 99.4  F (37.4  C) (Tympanic)  Resp 14  Ht 5' 3\" (1.6 m)  Wt 153 lb (69.4 kg)  SpO2 100%  BMI 27.1 kg/m2  Body mass index is 27.1 kg/(m^2).  GENERAL: healthy, alert and no distress  HENT: ear canals and TM's normal, nose and mouth without ulcers or lesions  NECK: no adenopathy, no " asymmetry, masses, or scars and thyroid normal to palpation  RESP: lungs clear to auscultation - no rales, rhonchi or wheezes  CV: regular rate and rhythm, normal S1 S2, no S3 or S4, no murmur, click or rub, no peripheral edema and peripheral pulses strong  MS: no gross musculoskeletal defects noted, no edema      ASSESSMENT/PLAN:       ICD-10-CM    1. Benign essential hypertension I10 Well controlled.  atenolol-chlorthalidone (TENORETIC 100) 100-25 MG per tablet     lisinopril (PRINIVIL/ZESTRIL) 10 MG tablet     Basic metabolic panel     2. Viral URI J06.9 Viral URI - treat symptomatically  Follow up in 2 weeks if no improvement    B97.89          The risks, benefits and treatment options of prescribed medications or other treatments have been discussed with the patient. The patient verbalized their understanding and should call or follow up if no improvement or if they develop further problems.    DANA Iyer CNP  [facility]      Addendum:  Sodium low - suspect chlorthalidone is the cause.  Discontinue the chlorthalidone.  Continue atenolol 100 mg daily.  Increase lisinopril to 20 mg daily.  Return in 2 weeks for an RN blood pressure check - we may need to continue to adjust the lisinopril.  Will need sodium recheck at that as well.    New medication and lab orders placed.  Ebony Hein, BETTYE

## 2018-07-19 NOTE — MR AVS SNAPSHOT
"              After Visit Summary   7/19/2018    Toya Mcnair    MRN: [id number]           Patient Information     Date Of Birth          1965        Visit Information        Provider Department      7/19/2018 1:00 PM Ebony Hein APRN CNP [facility]        Today's Diagnoses     Benign essential hypertension    -  1    Viral URI           Follow-ups after your visit        Who to contact     If you have questions or need follow up information about today's clinic visit or your schedule please contact [facility] directly at [phone].  Normal or non-critical lab and imaging results will be communicated to you by MyChart, letter or phone within 4 business days after the clinic has received the results. If you do not hear from us within 7 days, please contact the clinic through MyChart or phone. If you have a critical or abnormal lab result, we will notify you by phone as soon as possible.  Submit refill requests through [employer] or call your pharmacy and they will forward the refill request to us. Please allow 3 business days for your refill to be completed.          Additional Information About Your Visit        Care EveryWhere ID     This is your Care EveryWhere ID. This could be used by other organizations to access your [place] medical records  [id number]        Your Vitals Were     Pulse Temperature Respirations Height Pulse Oximetry BMI (Body Mass Index)    76 99.4  F (37.4  C) (Tympanic) 14 5' 3\" (1.6 m) 100% 27.1 kg/m2       Blood Pressure from Last 3 Encounters:   07/19/18 136/74   05/14/18 129/85   05/10/18 136/82    Weight from Last 3 Encounters:   07/19/18 153 lb (69.4 kg)   05/14/18 151 lb (68.5 kg)   05/10/18 151 lb 12.8 oz (68.9 kg)              We Performed the Following     Basic metabolic panel          Where to get your medicines      These medications were sent to [facility] [place], [place] - [address]  [address] [place] " [facility] [address]     Phone:  [phone]     atenolol-chlorthalidone 100-25 MG per tablet    lisinopril 10 MG tablet          Primary Care Provider Office Phone # Fax #    DANA Kenney [phone] [address] [facility] [address]        Equal Access to Services     FAN GUILLAUME : Hadii aad ku hadasho Soomaali, waaxda luqadaha, qaybta kaalmada adeegyada, waxay idiin hayaan adeeg kharash la'aan ah. So [facility] [phone].    ATENCIÓN: Si habla español, tiene a lopez disposición servicios gratuitos de asistencia lingüística. Arben al [phone].    We comply with applicable federal civil rights laws and [place] laws. We do not discriminate on the basis of race, color, national origin, age, disability, sex, sexual orientation, or gender identity.            Thank you!     Thank you for choosing [facility]  for your care. Our goal is always to provide you with excellent care. Hearing back from our patients is one way we can continue to improve our services. Please take a few minutes to complete the written survey that you may receive in the mail after your visit with us. Thank you!             Your Updated Medication List - Protect others around you: Learn how to safely use, store and throw away your medicines at www.disposemymeds.org.          This list is accurate as of 7/19/18  1:39 PM.  Always use your most recent med list.                   Brand Name Dispense Instructions for use Diagnosis    albuterol 108 (90 Base) MCG/ACT Inhaler    PROAIR HFA/PROVENTIL HFA/VENTOLIN HFA    1 Inhaler    INHALE 1-2 PUFFS EVERY 4-6 HOURS AS NEEDED    Tobacco abuse       atenolol-chlorthalidone 100-25 MG per tablet    TENORETIC 100    30 tablet    Take 1 tablet by mouth daily    Benign essential hypertension       COMPOUNDED NON-CONTROLLED SUBSTANCE - PHARMACY TO MIX COMPOUNDED MEDICATION    CMPD RX    30 g    Place 1 applicator rectally 2 times daily    Anal fissure        GLYCERYL TRINITRATE OINTMENT 0.2%     30 g    Place 1 applicator rectally 2 times daily        hydrocortisone 2.5 % cream    ANUSOL-HC    30 g    Place rectally 2 times daily    External hemorrhoids       lidocaine 5 % ointment    XYLOCAINE    30 g    Apply topically as needed for moderate pain (at anal area)        lisinopril 10 MG tablet    PRINIVIL/ZESTRIL    30 tablet    TAKE 1 TABLET (10 MG) BY MOUTH DAILY    Benign essential hypertension       WOMENS ONE DAILY Tabs      Take 1 tablet by mouth daily.

## 2018-07-20 RX ORDER — ATENOLOL 100 MG/1
100 TABLET ORAL DAILY
Qty: 90 TABLET | Refills: 3 | Status: SHIPPED | OUTPATIENT
Start: 2018-07-20 | End: 2018-07-24

## 2018-07-20 RX ORDER — LISINOPRIL 20 MG/1
20 TABLET ORAL DAILY
Qty: 30 TABLET | Refills: 1 | Status: SHIPPED | OUTPATIENT
Start: 2018-07-20 | End: 2018-07-24

## 2018-07-24 RX ORDER — LISINOPRIL 20 MG/1
20 TABLET ORAL DAILY
Qty: 30 TABLET | Refills: 1 | Status: SHIPPED | OUTPATIENT
Start: 2018-07-24 | End: 2018-09-18

## 2018-07-24 RX ORDER — ATENOLOL 100 MG/1
100 TABLET ORAL DAILY
Qty: 90 TABLET | Refills: 3 | Status: SHIPPED | OUTPATIENT
Start: 2018-07-24 | End: 2019-07-30

## 2018-09-18 DIAGNOSIS — I10 BENIGN ESSENTIAL HYPERTENSION: ICD-10-CM

## 2018-09-18 NOTE — TELEPHONE ENCOUNTER
"    Lisinopril, 20 mg  Last Written Prescription Date:  7/24/18  Last Fill Quantity: 30,  # refills: 1   Last office visit: 7/19/2018 with prescribing provider:      Future Office Visit:      Requested Prescriptions   Pending Prescriptions Disp Refills     lisinopril (PRINIVIL/ZESTRIL) 20 MG tablet [Pharmacy Med Name: LISINOPRIL 20 MG    TAB ACTA] 30 tablet 0     Sig: TAKE 1 TABLET (20 MG) BY MOUTH DAILY    ACE Inhibitors (Including Combos) Protocol Failed    9/18/2018  6:05 PM       Failed - Normal serum creatinine on file in past 12 months    Recent Labs   Lab Test  07/19/18   1335   CR  1.05*            Passed - Blood pressure under 140/90 in past 12 months    BP Readings from Last 3 Encounters:   07/19/18 136/74   05/14/18 129/85   05/10/18 136/82                Passed - Recent (12 mo) or future (30 days) visit within the authorizing provider's specialty    Patient had office visit in the last 12 months or has a visit in the next 30 days with authorizing provider or within the authorizing provider's specialty.  See \"Patient Info\" tab in inbasket, or \"Choose Columns\" in Meds & Orders section of the refill encounter.           Passed - Patient is age 18 or older       Passed - No active pregnancy on record       Passed - Normal serum potassium on file in past 12 months    Recent Labs   Lab Test  07/19/18   1335   POTASSIUM  3.6            Passed - No positive pregnancy test in past 12 months        Routing refill request to provider for review/approval because:  Pt has not returned for RN blood pressure recheck or lab recheck as noted below.  Left non-detailed message for patient to return a call to the clinic MOE.   JUVENCIO Newby RN      Pt was seen 7/19/18 with the following instructions:  Addendum:  Sodium low - suspect chlorthalidone is the cause.  Discontinue the chlorthalidone.  Continue atenolol 100 mg daily.  Increase lisinopril to 20 mg daily.  Return in 2 weeks for an RN blood pressure check - we may need to " continue to adjust the lisinopril.  Will need sodium recheck at that as well.     New medication and lab orders placed.  Ebony Hein, CNP

## 2018-09-18 NOTE — LETTER
[facility]  [address] [address]  Phone: [phone]       September 25, 2018         Toya Mcnair  [address] [place] [address]            Dear Toya:    We are concerned about your health care.  We recently provided you with medication refills.  Many medications require routine follow-up with your doctor.    Your prescription(s) have been refilled for 30 days so you may have time for the above noted follow-up. Please call to schedule soon so we can assure you have an appointment before your next refills are needed.    Thank you,      ONEIL Awan / jae

## 2018-09-25 RX ORDER — LISINOPRIL 20 MG/1
TABLET ORAL
Qty: 30 TABLET | Refills: 0 | Status: SHIPPED | OUTPATIENT
Start: 2018-09-25 | End: 2018-10-26

## 2018-09-25 NOTE — TELEPHONE ENCOUNTER
Left message for patient to return call  AIDA Arenas, RN    Gave 30 days and sent letter to schedule a visit

## 2018-10-20 DIAGNOSIS — I10 BENIGN ESSENTIAL HYPERTENSION: ICD-10-CM

## 2018-10-22 NOTE — TELEPHONE ENCOUNTER
"Requested Prescriptions   Pending Prescriptions Disp Refills     lisinopril (PRINIVIL/ZESTRIL) 20 MG tablet [Pharmacy Med Name: LISINOPRIL 20 MG    TAB ACTA]  Last Written Prescription Date:  09/25/18  Last Fill Quantity: 30,  # refills: 0   Last office visit: 7/19/2018 with prescribing provider:  07/19/18   Future Office Visit:     30 tablet 0     Sig: TAKE ONE TABLET BY MOUTH ONE TIME DAILY    ACE Inhibitors (Including Combos) Protocol Failed    10/20/2018  5:10 PM       Failed - Normal serum creatinine on file in past 12 months    Recent Labs   Lab Test  07/19/18   1335   CR  1.05*          Passed - Blood pressure under 140/90 in past 12 months    BP Readings from Last 3 Encounters:   07/19/18 136/74   05/14/18 129/85   05/10/18 136/82          Passed - Recent (12 mo) or future (30 days) visit within the authorizing provider's specialty    Patient had office visit in the last 12 months or has a visit in the next 30 days with authorizing provider or within the authorizing provider's specialty.  See \"Patient Info\" tab in inbasket, or \"Choose Columns\" in Meds & Orders section of the refill encounter.         Passed - Patient is age 18 or older       Passed - No active pregnancy on record       Passed - Normal serum potassium on file in past 12 months    Recent Labs   Lab Test  07/19/18   1335   POTASSIUM  3.6          Passed - No positive pregnancy test in past 12 months          "

## 2018-10-22 NOTE — TELEPHONE ENCOUNTER
Pt was seen 7/19/18 with the following instructions:  Addendum:  Sodium low - suspect chlorthalidone is the cause.  Discontinue the chlorthalidone.  Continue atenolol 100 mg daily.  Increase lisinopril to 20 mg daily.  Return in 2 weeks for an RN blood pressure check - we may need to continue to adjust the lisinopril.  Will need sodium recheck at that as well.      New medication and lab orders placed.  Ebony Hein CNP      From 9/18/18 refill encounter, Patient was called 3 times with no return call, 30 days delma given and letter mailed to home.     Routing refill request to provider for review/approval because:  Delma given x1 and patient did not follow up, please advise

## 2018-10-23 RX ORDER — LISINOPRIL 20 MG/1
TABLET ORAL
Qty: 15 TABLET | Refills: 0 | OUTPATIENT
Start: 2018-10-23

## 2018-10-24 NOTE — TELEPHONE ENCOUNTER
Refused Medication Requests        Lisinopril 20 MG TAKE ONE TABLET BY MOUTH ONE TIME DAILY     Tried to call and reached voicemail again, Left message on answering machine for patient to call back.  Kinjal Rubio RNC

## 2018-10-26 ENCOUNTER — TELEPHONE (OUTPATIENT)
Dept: FAMILY MEDICINE | Facility: CLINIC | Age: 53
End: 2018-10-26

## 2018-10-26 ENCOUNTER — ALLIED HEALTH/NURSE VISIT (OUTPATIENT)
Dept: FAMILY MEDICINE | Facility: CLINIC | Age: 53
End: 2018-10-26
Payer: COMMERCIAL

## 2018-10-26 VITALS — SYSTOLIC BLOOD PRESSURE: 120 MMHG | HEART RATE: 83 BPM | DIASTOLIC BLOOD PRESSURE: 88 MMHG

## 2018-10-26 DIAGNOSIS — I10 BENIGN ESSENTIAL HYPERTENSION: ICD-10-CM

## 2018-10-26 DIAGNOSIS — Z01.30 BP CHECK: Primary | ICD-10-CM

## 2018-10-26 LAB
ANION GAP SERPL CALCULATED.3IONS-SCNC: 7 MMOL/L (ref 3–14)
BUN SERPL-MCNC: 13 MG/DL (ref 7–30)
CALCIUM SERPL-MCNC: 8.6 MG/DL (ref 8.5–10.1)
CHLORIDE SERPL-SCNC: 105 MMOL/L (ref 94–109)
CO2 SERPL-SCNC: 27 MMOL/L (ref 20–32)
CREAT SERPL-MCNC: 1.06 MG/DL (ref 0.52–1.04)
GFR SERPL CREATININE-BSD FRML MDRD: 54 ML/MIN/1.7M2
GLUCOSE SERPL-MCNC: 91 MG/DL (ref 70–99)
POTASSIUM SERPL-SCNC: 3.6 MMOL/L (ref 3.4–5.3)
SODIUM SERPL-SCNC: 139 MMOL/L (ref 133–144)

## 2018-10-26 PROCEDURE — 80048 BASIC METABOLIC PNL TOTAL CA: CPT | Performed by: NURSE PRACTITIONER

## 2018-10-26 PROCEDURE — 99207 ZZC NO CHARGE NURSE ONLY: CPT

## 2018-10-26 PROCEDURE — 36415 COLL VENOUS BLD VENIPUNCTURE: CPT | Performed by: NURSE PRACTITIONER

## 2018-10-26 NOTE — TELEPHONE ENCOUNTER
S-(situation): BP Check    B-(background): LOV 07/19/18:   Sodium low - suspect chlorthalidone is the cause.  Discontinue the chlorthalidone.  Continue atenolol 100 mg daily.  Increase lisinopril to 20 mg daily.  Return in 2 weeks for an RN blood pressure check - we may need to continue to adjust the lisinopril.  Will need sodium recheck at that as well    A-(assessment):   Vital Signs 10/26/2018   Systolic 120   Diastolic 88   Pulse 83   Manual    No complaints reported at time of visit.  Home BPs range in 120/80s    Will complete Labs today.       R-(recommendations): Await lab results. Patient will need refill on Lisinopril before next week.       Cleopatra SEGURA RN

## 2018-10-26 NOTE — PROGRESS NOTES
S-(situation): BP Check    B-(background): LOV 07/19/18:   Sodium low - suspect chlorthalidone is the cause.  Discontinue the chlorthalidone.  Continue atenolol 100 mg daily.  Increase lisinopril to 20 mg daily.  Return in 2 weeks for an RN blood pressure check - we may need to continue to adjust the lisinopril.  Will need sodium recheck at that as well    A-(assessment):   Vital Signs 10/26/2018   Systolic 120   Diastolic 88   Pulse 83   Manual    No complaints reported at time of visit.  Home BPs range in 120/80s    Will complete Labs today.       R-(recommendations): Await lab results.        Cleopatra SEGURA RN

## 2018-10-26 NOTE — MR AVS SNAPSHOT
After Visit Summary   10/26/2018    Toya Mcnair    MRN: [id number]           Patient Information     Date Of Birth          1965        Visit Information        Provider Department      10/26/2018 3:30 PM LIAT RILEY/ANDRES RN [facility]        Today's Diagnoses     BP check    -  1       Follow-ups after your visit        Who to contact     If you have questions or need follow up information about today's clinic visit or your schedule please contact [facility] directly at [phone].  Normal or non-critical lab and imaging results will be communicated to you by MyChart, letter or phone within 4 business days after the clinic has received the results. If you do not hear from us within 7 days, please contact the clinic through MyChart or phone. If you have a critical or abnormal lab result, we will notify you by phone as soon as possible.  Submit refill requests through [employer] or call your pharmacy and they will forward the refill request to us. Please allow 3 business days for your refill to be completed.          Additional Information About Your Visit        Care EveryWhere ID     This is your Care EveryWhere ID. This could be used by other organizations to access your [place] medical records  [id number]        Your Vitals Were     Pulse                   83            Blood Pressure from Last 3 Encounters:   10/26/18 120/88   07/19/18 136/74   05/14/18 129/85    Weight from Last 3 Encounters:   07/19/18 153 lb (69.4 kg)   05/14/18 151 lb (68.5 kg)   05/10/18 151 lb 12.8 oz (68.9 kg)              Today, you had the following     No orders found for display       Primary Care Provider Office Phone # Fax #    Elizabet DANA Pandya [facility] [phone] [address] [facility] [address]        Equal Access to Services     FAN GUILLAUME : Salomon Frost, virgilio villeda, alicia ardon, rosaura aponte  lajaqui serna. So [facility] [phone].    ATENCIÓN: Si habla alfonso, tiene a lopez disposición servicios gratuitos de asistencia lingüística. Arben linda [phone].    We comply with applicable federal civil rights laws and [place] laws. We do not discriminate on the basis of race, color, national origin, age, disability, sex, sexual orientation, or gender identity.            Thank you!     Thank you for choosing [facility]  for your care. Our goal is always to provide you with excellent care. Hearing back from our patients is one way we can continue to improve our services. Please take a few minutes to complete the written survey that you may receive in the mail after your visit with us. Thank you!             Your Updated Medication List - Protect others around you: Learn how to safely use, store and throw away your medicines at www.disposemymeds.org.          This list is accurate as of 10/26/18  3:41 PM.  Always use your most recent med list.                   Brand Name Dispense Instructions for use Diagnosis    albuterol 108 (90 Base) MCG/ACT inhaler    PROAIR HFA/PROVENTIL HFA/VENTOLIN HFA    1 Inhaler    INHALE 1-2 PUFFS EVERY 4-6 HOURS AS NEEDED    Tobacco abuse       atenolol 100 MG tablet    TENORMIN    90 tablet    Take 1 tablet (100 mg) by mouth daily    Benign essential hypertension       COMPOUNDED NON-CONTROLLED SUBSTANCE - PHARMACY TO MIX COMPOUNDED MEDICATION    CMPD RX    30 g    Place 1 applicator rectally 2 times daily    Anal fissure       hydrocortisone 2.5 % cream    ANUSOL-HC    30 g    Place rectally 2 times daily    External hemorrhoids       lidocaine 5 % ointment    XYLOCAINE    30 g    Apply topically as needed for moderate pain (at anal area)        lisinopril 20 MG tablet    PRINIVIL/ZESTRIL    30 tablet    TAKE 1 TABLET (20 MG) BY MOUTH DAILY    Benign essential hypertension       WOMENS ONE DAILY Tabs      Take 1 tablet by mouth daily.

## 2018-10-29 RX ORDER — LISINOPRIL 20 MG/1
20 TABLET ORAL DAILY
Qty: 90 TABLET | Refills: 3 | Status: SHIPPED | OUTPATIENT
Start: 2018-10-29

## 2019-06-19 ENCOUNTER — TELEPHONE (OUTPATIENT)
Dept: FAMILY MEDICINE | Facility: CLINIC | Age: 54
End: 2019-06-19

## 2019-06-19 NOTE — TELEPHONE ENCOUNTER
Panel Management Review        Composite cancer screening  Chart review shows that this patient is due/due soon for the following Pap Smear, Mammogram and Colonoscopy  Summary:    Patient is due/failing the following:   COLONOSCOPY, MAMMOGRAM and PAP    Action needed:   Patient needs office visit for physical exam/ pap smear  Mammogram  Colonoscopy order.    Type of outreach:    Sent letter.    Questions for provider review:    None                                                                                                                                    CELSA PIMENTEL

## 2019-06-19 NOTE — LETTER
[facility]  [address]  [facility] [address] [phone]    June 19, 2019    Toya Mcnair  [address] [place] [phone]          Dear Toya,    --Pap smear screening is recommended every 3 years. Some patients require more frequent Pap smears based on an individual assessment of other risk factors. Please call the clinic to schedule this in the near future.   --Mammogram screening is generally recommended every year starting at age of 50.  Some women may choose to be screened at an earlier age ( between 40 & 50) depending on risk factors and discussions with her primary provider.   Mammogram scheduling -  [phone]  --Colon cancer screening is generally recommended in all patients over the age of 50 years of age. This can be with either colonoscopy every 10 years, or testing the stool for blood one time per year (called a FIT test, a simple card you can send back to us in the mail). On review of our electronic medical record it appears you are due for colon cancer screening. Please call the clinic to assist in setting up a colonoscopy or, if you prefer, setting up the test for stool blood(FIT).    If you have had any of these tests at an outside facility, please let us know so that we can update your record.     Please disregard this notice if you have already scheduled an appointment.     Sincerely,    Ebony ALLEN  [facility] - [phone]  [email or web address]

## 2019-07-30 DIAGNOSIS — I10 BENIGN ESSENTIAL HYPERTENSION: ICD-10-CM

## 2019-07-30 RX ORDER — ATENOLOL 100 MG/1
TABLET ORAL
Qty: 30 TABLET | Refills: 0 | Status: SHIPPED | OUTPATIENT
Start: 2019-07-30 | End: 2019-08-30

## 2019-07-30 NOTE — TELEPHONE ENCOUNTER
"Requested Prescriptions   Pending Prescriptions Disp Refills     atenolol (TENORMIN) 100 MG tablet [Pharmacy Med Name: ATENOLOL 100MG TABS] 150 tablet 0     Sig: TAKE ONE TABLET BY MOUTH ONCE DAILY       Beta-Blockers Protocol Failed - 7/30/2019 12:31 PM        Failed - Recent (12 mo) or future (30 days) visit within the authorizing provider's specialty     Patient had office visit in the last 12 months or has a visit in the next 30 days with authorizing provider or within the authorizing provider's specialty.  See \"Patient Info\" tab in inbasket, or \"Choose Columns\" in Meds & Orders section of the refill encounter.              Passed - Blood pressure under 140/90 in past 12 months     BP Readings from Last 3 Encounters:   10/26/18 120/88   07/19/18 136/74   05/14/18 129/85                 Passed - Patient is age 6 or older        Passed - Medication is active on med list        Due for clinic visit. 30 day delma fill given. Reminder letter sent.      Cleopatra SEGURA RN    "

## 2019-07-30 NOTE — LETTER
[facility]  [address] [facility] [address]  Phone: [phone]       July 30, 2019         Toya Mcnair  [address] [place] [phone]            Dear Toya:    We are concerned about your health care.  We recently provided you with medication refills.  Many medications require routine follow-up with your doctor.    Your prescription(s) have been refilled for 30 days so you may have time for the above noted follow-up. Please call to schedule soon so we can assure you have an appointment before your next refills are needed.    Thank you,      ONEIL Awan / abiel

## 2019-08-30 ENCOUNTER — TELEPHONE (OUTPATIENT)
Dept: FAMILY MEDICINE | Facility: CLINIC | Age: 54
End: 2019-08-30

## 2019-08-30 DIAGNOSIS — I10 BENIGN ESSENTIAL HYPERTENSION: ICD-10-CM

## 2019-08-30 RX ORDER — ATENOLOL 100 MG/1
TABLET ORAL
Qty: 30 TABLET | Refills: 0 | Status: SHIPPED | OUTPATIENT
Start: 2019-08-30

## 2019-08-30 NOTE — TELEPHONE ENCOUNTER
Left message on answering machine for patient to call back.    Patient was given delma fill.    Patient needs appt.    Thank you    Keyla JAMES RN

## 2019-08-30 NOTE — TELEPHONE ENCOUNTER
Pt scheduled for 9/26/19 with PCP.   prescription provided to allow patient to get to scheduled appointment per [facility] refill protocol.

## 2019-08-30 NOTE — TELEPHONE ENCOUNTER
"Requested Prescriptions   Pending Prescriptions Disp Refills     atenolol (TENORMIN) 100 MG tablet [Pharmacy Med Name: ATENOLOL 100MG  Last Written Prescription Date:  07/30/19  Last Fill Quantity: 30,  # refills: 0   Last office visit: 10/26/2018 with prescribing provider:  Ebony Hein   Future Office Visit:     TABS] 30 tablet 0     Sig: TAKE ONE TABLET BY MOUTH ONCE DAILY       Beta-Blockers Protocol Failed - 8/30/2019  8:39 AM        Failed - Recent (12 mo) or future (30 days) visit within the authorizing provider's specialty     Patient had office visit in the last 12 months or has a visit in the next 30 days with authorizing provider or within the authorizing provider's specialty.  See \"Patient Info\" tab in inbasket, or \"Choose Columns\" in Meds & Orders section of the refill encounter.          Passed - Blood pressure under 140/90 in past 12 months     BP Readings from Last 3 Encounters:   10/26/18 120/88   07/19/18 136/74   05/14/18 129/85           Passed - Patient is age 6 or older        Passed - Medication is active on med list            "

## 2023-07-17 ENCOUNTER — APPOINTMENT (OUTPATIENT)
Dept: OCCUPATIONAL MEDICINE | Facility: CLINIC | Age: 58
End: 2023-07-17

## 2023-07-17 PROCEDURE — 99000 SPECIMEN HANDLING OFFICE-LAB: CPT | Performed by: NURSE PRACTITIONER
